# Patient Record
Sex: MALE | Race: OTHER | HISPANIC OR LATINO | URBAN - METROPOLITAN AREA
[De-identification: names, ages, dates, MRNs, and addresses within clinical notes are randomized per-mention and may not be internally consistent; named-entity substitution may affect disease eponyms.]

---

## 2023-09-13 ENCOUNTER — INPATIENT (INPATIENT)
Facility: HOSPITAL | Age: 63
LOS: 9 days | Discharge: HOME HEALTH | DRG: 180 | End: 2023-09-23
Attending: STUDENT IN AN ORGANIZED HEALTH CARE EDUCATION/TRAINING PROGRAM | Admitting: HOSPITALIST
Payer: MEDICAID

## 2023-09-13 VITALS
DIASTOLIC BLOOD PRESSURE: 90 MMHG | SYSTOLIC BLOOD PRESSURE: 140 MMHG | HEART RATE: 114 BPM | OXYGEN SATURATION: 99 % | TEMPERATURE: 98 F | RESPIRATION RATE: 18 BRPM

## 2023-09-13 LAB
ALBUMIN SERPL ELPH-MCNC: 3.5 G/DL — SIGNIFICANT CHANGE UP (ref 3.3–5)
ALP SERPL-CCNC: 160 U/L — HIGH (ref 40–120)
ALT FLD-CCNC: 48 U/L — SIGNIFICANT CHANGE UP (ref 12–78)
ANION GAP SERPL CALC-SCNC: 6 MMOL/L — SIGNIFICANT CHANGE UP (ref 5–17)
APTT BLD: 29.1 SEC — SIGNIFICANT CHANGE UP (ref 24.5–35.6)
AST SERPL-CCNC: 24 U/L — SIGNIFICANT CHANGE UP (ref 15–37)
BASOPHILS # BLD AUTO: 0 K/UL — SIGNIFICANT CHANGE UP (ref 0–0.2)
BASOPHILS NFR BLD AUTO: 0 % — SIGNIFICANT CHANGE UP (ref 0–2)
BILIRUB SERPL-MCNC: 0.3 MG/DL — SIGNIFICANT CHANGE UP (ref 0.2–1.2)
BUN SERPL-MCNC: 10 MG/DL — SIGNIFICANT CHANGE UP (ref 7–23)
CALCIUM SERPL-MCNC: 9.5 MG/DL — SIGNIFICANT CHANGE UP (ref 8.5–10.1)
CHLORIDE SERPL-SCNC: 100 MMOL/L — SIGNIFICANT CHANGE UP (ref 96–108)
CO2 SERPL-SCNC: 30 MMOL/L — SIGNIFICANT CHANGE UP (ref 22–31)
CREAT SERPL-MCNC: 0.87 MG/DL — SIGNIFICANT CHANGE UP (ref 0.5–1.3)
EGFR: 98 ML/MIN/1.73M2 — SIGNIFICANT CHANGE UP
EOSINOPHIL # BLD AUTO: 0 K/UL — SIGNIFICANT CHANGE UP (ref 0–0.5)
EOSINOPHIL NFR BLD AUTO: 0 % — SIGNIFICANT CHANGE UP (ref 0–6)
ERYTHROCYTE [SEDIMENTATION RATE] IN BLOOD: 86 MM/HR — HIGH (ref 0–20)
GLUCOSE BLDC GLUCOMTR-MCNC: 452 MG/DL — CRITICAL HIGH (ref 70–99)
GLUCOSE SERPL-MCNC: 592 MG/DL — CRITICAL HIGH (ref 70–99)
HCT VFR BLD CALC: 37.8 % — LOW (ref 39–50)
HGB BLD-MCNC: 13.5 G/DL — SIGNIFICANT CHANGE UP (ref 13–17)
INR BLD: 0.92 RATIO — SIGNIFICANT CHANGE UP (ref 0.85–1.18)
LACTATE SERPL-SCNC: 1.9 MMOL/L — SIGNIFICANT CHANGE UP (ref 0.7–2)
LYMPHOCYTES # BLD AUTO: 1.78 K/UL — SIGNIFICANT CHANGE UP (ref 1–3.3)
LYMPHOCYTES # BLD AUTO: 26 % — SIGNIFICANT CHANGE UP (ref 13–44)
MANUAL SMEAR VERIFICATION: SIGNIFICANT CHANGE UP
MCHC RBC-ENTMCNC: 31.9 PG — SIGNIFICANT CHANGE UP (ref 27–34)
MCHC RBC-ENTMCNC: 35.7 GM/DL — SIGNIFICANT CHANGE UP (ref 32–36)
MCV RBC AUTO: 89.4 FL — SIGNIFICANT CHANGE UP (ref 80–100)
MONOCYTES # BLD AUTO: 0.21 K/UL — SIGNIFICANT CHANGE UP (ref 0–0.9)
MONOCYTES NFR BLD AUTO: 3 % — SIGNIFICANT CHANGE UP (ref 2–14)
NEUTROPHILS # BLD AUTO: 4.8 K/UL — SIGNIFICANT CHANGE UP (ref 1.8–7.4)
NEUTROPHILS NFR BLD AUTO: 67 % — SIGNIFICANT CHANGE UP (ref 43–77)
NEUTS BAND # BLD: 3 % — SIGNIFICANT CHANGE UP (ref 0–8)
NRBC # BLD: 0 /100 — SIGNIFICANT CHANGE UP (ref 0–0)
NRBC # BLD: SIGNIFICANT CHANGE UP /100 WBCS (ref 0–0)
PLAT MORPH BLD: NORMAL — SIGNIFICANT CHANGE UP
PLATELET # BLD AUTO: 333 K/UL — SIGNIFICANT CHANGE UP (ref 150–400)
POTASSIUM SERPL-MCNC: 3.7 MMOL/L — SIGNIFICANT CHANGE UP (ref 3.5–5.3)
POTASSIUM SERPL-SCNC: 3.7 MMOL/L — SIGNIFICANT CHANGE UP (ref 3.5–5.3)
PROT SERPL-MCNC: 8.1 GM/DL — SIGNIFICANT CHANGE UP (ref 6–8.3)
PROTHROM AB SERPL-ACNC: 10.4 SEC — SIGNIFICANT CHANGE UP (ref 9.5–13)
RBC # BLD: 4.23 M/UL — SIGNIFICANT CHANGE UP (ref 4.2–5.8)
RBC # FLD: 11.8 % — SIGNIFICANT CHANGE UP (ref 10.3–14.5)
RBC BLD AUTO: NORMAL — SIGNIFICANT CHANGE UP
SODIUM SERPL-SCNC: 136 MMOL/L — SIGNIFICANT CHANGE UP (ref 135–145)
VARIANT LYMPHS # BLD: 1 % — SIGNIFICANT CHANGE UP (ref 0–6)
WBC # BLD: 6.85 K/UL — SIGNIFICANT CHANGE UP (ref 3.8–10.5)
WBC # FLD AUTO: 6.85 K/UL — SIGNIFICANT CHANGE UP (ref 3.8–10.5)

## 2023-09-13 PROCEDURE — 71046 X-RAY EXAM CHEST 2 VIEWS: CPT | Mod: 26

## 2023-09-13 PROCEDURE — 99285 EMERGENCY DEPT VISIT HI MDM: CPT

## 2023-09-13 PROCEDURE — 73130 X-RAY EXAM OF HAND: CPT | Mod: 26,LT

## 2023-09-13 RX ORDER — PIPERACILLIN AND TAZOBACTAM 4; .5 G/20ML; G/20ML
3.38 INJECTION, POWDER, LYOPHILIZED, FOR SOLUTION INTRAVENOUS ONCE
Refills: 0 | Status: COMPLETED | OUTPATIENT
Start: 2023-09-13 | End: 2023-09-13

## 2023-09-13 RX ORDER — VANCOMYCIN HCL 1 G
1000 VIAL (EA) INTRAVENOUS ONCE
Refills: 0 | Status: COMPLETED | OUTPATIENT
Start: 2023-09-13 | End: 2023-09-13

## 2023-09-13 RX ORDER — INSULIN HUMAN 100 [IU]/ML
12 INJECTION, SOLUTION SUBCUTANEOUS ONCE
Refills: 0 | Status: COMPLETED | OUTPATIENT
Start: 2023-09-13 | End: 2023-09-13

## 2023-09-13 RX ADMIN — INSULIN HUMAN 12 UNIT(S): 100 INJECTION, SOLUTION SUBCUTANEOUS at 23:52

## 2023-09-13 RX ADMIN — Medication 250 MILLIGRAM(S): at 22:55

## 2023-09-13 RX ADMIN — PIPERACILLIN AND TAZOBACTAM 200 GRAM(S): 4; .5 INJECTION, POWDER, LYOPHILIZED, FOR SOLUTION INTRAVENOUS at 21:59

## 2023-09-13 NOTE — ED ADULT NURSE NOTE - NSFALLUNIVINTERV_ED_ALL_ED
Bed/Stretcher in lowest position, wheels locked, appropriate side rails in place/Call bell, personal items and telephone in reach/Instruct patient to call for assistance before getting out of bed/chair/stretcher/Non-slip footwear applied when patient is off stretcher/[place] to call system/Physically safe environment - no spills, clutter or unnecessary equipment/Purposeful proactive rounding/Room/bathroom lighting operational, light cord in reach

## 2023-09-13 NOTE — ED STATDOCS - PROGRESS NOTE DETAILS
Yasir Valle for attending Dr. Camarillo: 64 y/o male presents to the ED c/o left 3rd finger necrosis and pain from injury 3 months ago. Will send to main for further evaluation.

## 2023-09-13 NOTE — PHARMACOTHERAPY INTERVENTION NOTE - COMMENTS
Medication reconciliation completed.  Pt does not take any medication at home, but states that he took an antibiotic for his finger.  Pt cannot confirm where he received the medication, nor if it was from a retail pharmacy or a facility (i.e. [facility], [facility]), nor the name of what he received.  Pt's son at bedside states "it's Amoxicillin, very low dose for him. It's ok."  No record of medication filled at any retail pharmacy per Dr. First Medrosi.

## 2023-09-13 NOTE — ED ADULT NURSE NOTE - OBJECTIVE STATEMENT
pt presents to ED c/o left third digit injury. Per pt, months ago he hurt his finger while laying in bed. Since then, injury has been getting progressively worse over the last 3 months. Pt w. necrotic finger. Denies pain.

## 2023-09-13 NOTE — CONSULT NOTE ADULT - SUBJECTIVE AND OBJECTIVE BOX
63y Male presents with necrotic 3rd distal phalanx after crush injury 3 mo prior. Patient report not seeing a physician after the injury. Denies numbness/tingling in the non necrotic aspect of the affected extremity. Denies head strike/LOC/other orthopedic injuries at this time. Patient is RIGHT hand dominant. Patient ambulates without assistance. Denies f/c.       PAST MEDICAL & SURGICAL HISTORY:    Home Medications:    Allergies    No Known Allergies    Intolerances                              13.5   6.85  )-----------( 333      ( 13 Sep 2023 21:13 )             37.8     09-13    136  |  100  |  10  ----------------------------<  592<HH>  3.7   |  30  |  0.87    Ca    9.5      13 Sep 2023 21:13    TPro  8.1  /  Alb  3.5  /  TBili  0.3  /  DBili  x   /  AST  24  /  ALT  48  /  AlkPhos  160<H>  09-13    PT/INR - ( 13 Sep 2023 21:13 )   PT: 10.4 sec;   INR: 0.92 ratio         PTT - ( 13 Sep 2023 21:13 )  PTT:29.1 sec  Urinalysis Basic - ( 13 Sep 2023 21:13 )    Color: x / Appearance: x / SG: x / pH: x  Gluc: 592 mg/dL / Ketone: x  / Bili: x / Urobili: x   Blood: x / Protein: x / Nitrite: x   Leuk Esterase: x / RBC: x / WBC x   Sq Epi: x / Non Sq Epi: x / Bacteria: x          Vital Signs Last 24 Hrs  T(C): 36.6 (13 Sep 2023 20:30), Max: 36.6 (13 Sep 2023 20:30)  T(F): 97.8 (13 Sep 2023 20:30), Max: 97.8 (13 Sep 2023 20:30)  HR: 114 (13 Sep 2023 20:30) (114 - 114)  BP: 140/90 (13 Sep 2023 20:30) (140/90 - 140/90)  BP(mean): 105 (13 Sep 2023 20:30) (105 - 105)  RR: 18 (13 Sep 2023 20:30) (18 - 18)  SpO2: 99% (13 Sep 2023 20:30) (99% - 99%)    Parameters below as of 13 Sep 2023 20:30  Patient On (Oxygen Delivery Method): room air        PHYSICAL EXAM  General: NAD, Awake and Alert, poor dentition       LUE:   Necrotic distal 3rd phalanx  +erythema & swelling  Mild tenderness to palpation over the 3rd digit  Minimal tenderness over the dorsum of the hand  NTTP over the bony prominences of the shoulder/elbow/wrist/hand  painless passive/active ROM of the shoulder/elbow/wrist/hand  C5-T1 SILT, motor grossly intact throughout axillary/musculocutaenous/radial/median/ulnar nerves  + radial pulse      SECONDARY EXAM: SILT throughout, motor grossly intact throughout, no other orthopedic injuries at this time, compartments soft and compressible          IMAGING:  XR : please refer to official report    Assessment/Plan:  63y Male with necrotic left third distal phalanx, cellulitis 3rd digit and dorsum of hand    -Digit will auto amputate, no urgent surgical intervention at present; can consider revision amputation on an outpatient basis  -Rec admission for IV abx for 3rd digit and hand cellulitis  -Pain control as needed  -DVT ppx per primary   -NWB L Hand  -Rec IV abx per primary vs ID  -Glycemic control   -FU A1c  -FU Labs  -ESR: 86  -Will monitor   -Will d/w Dr. Garcia  63y Male presents with necrotic 3rd distal phalanx after crush injury 3 mo prior. Patient report not seeing a physician after the injury. Denies numbness/tingling in the non necrotic aspect of the affected extremity. Denies head strike/LOC/other orthopedic injuries at this time. Patient is RIGHT hand dominant. Patient ambulates without assistance. Denies f/c.       PAST MEDICAL & SURGICAL HISTORY:    Home Medications:    Allergies    No Known Allergies    Intolerances                              13.5   6.85  )-----------( 333      ( 13 Sep 2023 21:13 )             37.8     09-13    136  |  100  |  10  ----------------------------<  592<HH>  3.7   |  30  |  0.87    Ca    9.5      13 Sep 2023 21:13    TPro  8.1  /  Alb  3.5  /  TBili  0.3  /  DBili  x   /  AST  24  /  ALT  48  /  AlkPhos  160<H>  09-13    PT/INR - ( 13 Sep 2023 21:13 )   PT: 10.4 sec;   INR: 0.92 ratio         PTT - ( 13 Sep 2023 21:13 )  PTT:29.1 sec  Urinalysis Basic - ( 13 Sep 2023 21:13 )    Color: x / Appearance: x / SG: x / pH: x  Gluc: 592 mg/dL / Ketone: x  / Bili: x / Urobili: x   Blood: x / Protein: x / Nitrite: x   Leuk Esterase: x / RBC: x / WBC x   Sq Epi: x / Non Sq Epi: x / Bacteria: x          Vital Signs Last 24 Hrs  T(C): 36.6 (13 Sep 2023 20:30), Max: 36.6 (13 Sep 2023 20:30)  T(F): 97.8 (13 Sep 2023 20:30), Max: 97.8 (13 Sep 2023 20:30)  HR: 114 (13 Sep 2023 20:30) (114 - 114)  BP: 140/90 (13 Sep 2023 20:30) (140/90 - 140/90)  BP(mean): 105 (13 Sep 2023 20:30) (105 - 105)  RR: 18 (13 Sep 2023 20:30) (18 - 18)  SpO2: 99% (13 Sep 2023 20:30) (99% - 99%)    Parameters below as of 13 Sep 2023 20:30  Patient On (Oxygen Delivery Method): room air        PHYSICAL EXAM  General: NAD, Awake and Alert, poor dentition       LUE:   Necrotic distal 3rd phalanx  +erythema & swelling  Mild tenderness to palpation over the 3rd digit  Minimal tenderness over the dorsum of the hand  NTTP over the bony prominences of the shoulder/elbow/wrist/hand  painless passive/active ROM of the shoulder/elbow/wrist/hand  C5-T1 SILT, motor grossly intact throughout axillary/musculocutaenous/radial/median/ulnar nerves  + radial pulse      SECONDARY EXAM: SILT throughout, motor grossly intact throughout, no other orthopedic injuries at this time, compartments soft and compressible          IMAGING:  XR : please refer to official report    Assessment/Plan:  63y Male with necrotic left third distal phalanx, cellulitis 3rd digit and dorsum of hand    -Digit will auto amputate, no urgent surgical intervention at present; can consider revision amputation on an outpatient basis; Patient at high risk for surgical complications given suspected uncontrolled DM  -Rec admission for IV abx for 3rd digit and hand cellulitis; suspected purulent drainage vs granulation tissue from 3rd finger  -Do not suspect flexor teno as digit is draining, no flexor posturing, no pain w/ extension; +swelling diffusely (not fusiform); +tenderness along tendon sheath, however diffusely mildly tender  -Pain control as needed  -DVT ppx per primary   -NWB L Hand  -Rec IV abx per primary vs ID  -Rec ID C/s for abx mgmt  -Glycemic control   -FU A1c  -FU Labs  -FU CRP  -FU BCx (Rec)  -ESR: 86  -Will monitor   -Will consider daily 50-50 soaks pending discussion w/ Dr. Garcia   -Will d/w Dr. Garcia

## 2023-09-13 NOTE — ED ADULT TRIAGE NOTE - CHIEF COMPLAINT QUOTE
- Start artificial tears BID-TID Pt ambulates to Er c/o left 3rd digit pain after injury 3 months ago. Swelling and necrosis noted.

## 2023-09-13 NOTE — ED STATDOCS - NS ED ATTENDING STATEMENT MOD
This was a shared visit with the [facility]. I reviewed and verified the documentation and independently performed the documented: Attending Only

## 2023-09-14 DIAGNOSIS — L03.90 CELLULITIS, UNSPECIFIED: ICD-10-CM

## 2023-09-14 LAB
A1C WITH ESTIMATED AVERAGE GLUCOSE RESULT: >15.5 % — HIGH (ref 4–5.6)
A1C WITH ESTIMATED AVERAGE GLUCOSE RESULT: >15.5 % — HIGH (ref 4–5.6)
ADD ON TEST-SPECIMEN IN LAB: SIGNIFICANT CHANGE UP
ALBUMIN SERPL ELPH-MCNC: 2.8 G/DL — LOW (ref 3.3–5)
ALP SERPL-CCNC: 94 U/L — SIGNIFICANT CHANGE UP (ref 40–120)
ALT FLD-CCNC: 43 U/L — SIGNIFICANT CHANGE UP (ref 12–78)
ANION GAP SERPL CALC-SCNC: 6 MMOL/L — SIGNIFICANT CHANGE UP (ref 5–17)
APTT BLD: 27.2 SEC — SIGNIFICANT CHANGE UP (ref 24.5–35.6)
AST SERPL-CCNC: 26 U/L — SIGNIFICANT CHANGE UP (ref 15–37)
BASOPHILS # BLD AUTO: 0.01 K/UL — SIGNIFICANT CHANGE UP (ref 0–0.2)
BASOPHILS NFR BLD AUTO: 0.1 % — SIGNIFICANT CHANGE UP (ref 0–2)
BILIRUB SERPL-MCNC: 0.4 MG/DL — SIGNIFICANT CHANGE UP (ref 0.2–1.2)
BUN SERPL-MCNC: 8 MG/DL — SIGNIFICANT CHANGE UP (ref 7–23)
CALCIUM SERPL-MCNC: 8.6 MG/DL — SIGNIFICANT CHANGE UP (ref 8.5–10.1)
CHLORIDE SERPL-SCNC: 108 MMOL/L — SIGNIFICANT CHANGE UP (ref 96–108)
CHOLEST SERPL-MCNC: 153 MG/DL — SIGNIFICANT CHANGE UP
CO2 SERPL-SCNC: 26 MMOL/L — SIGNIFICANT CHANGE UP (ref 22–31)
CREAT SERPL-MCNC: 0.46 MG/DL — LOW (ref 0.5–1.3)
CRP SERPL-MCNC: 96 MG/L — HIGH
EGFR: 118 ML/MIN/1.73M2 — SIGNIFICANT CHANGE UP
EOSINOPHIL # BLD AUTO: 0.02 K/UL — SIGNIFICANT CHANGE UP (ref 0–0.5)
EOSINOPHIL NFR BLD AUTO: 0.3 % — SIGNIFICANT CHANGE UP (ref 0–6)
ESTIMATED AVERAGE GLUCOSE: >398 MG/DL — HIGH (ref 68–114)
ESTIMATED AVERAGE GLUCOSE: >398 MG/DL — HIGH (ref 68–114)
GLUCOSE BLDC GLUCOMTR-MCNC: 207 MG/DL — HIGH (ref 70–99)
GLUCOSE BLDC GLUCOMTR-MCNC: 224 MG/DL — HIGH (ref 70–99)
GLUCOSE BLDC GLUCOMTR-MCNC: 252 MG/DL — HIGH (ref 70–99)
GLUCOSE BLDC GLUCOMTR-MCNC: 285 MG/DL — HIGH (ref 70–99)
GLUCOSE BLDC GLUCOMTR-MCNC: 305 MG/DL — HIGH (ref 70–99)
GLUCOSE SERPL-MCNC: 266 MG/DL — HIGH (ref 70–99)
HCT VFR BLD CALC: 31.7 % — LOW (ref 39–50)
HCV AB S/CO SERPL IA: 0.11 S/CO — SIGNIFICANT CHANGE UP (ref 0–0.99)
HCV AB SERPL-IMP: SIGNIFICANT CHANGE UP
HDLC SERPL-MCNC: 45 MG/DL — SIGNIFICANT CHANGE UP
HGB BLD-MCNC: 11.2 G/DL — LOW (ref 13–17)
IMM GRANULOCYTES NFR BLD AUTO: 0.6 % — SIGNIFICANT CHANGE UP (ref 0–0.9)
INR BLD: 1 RATIO — SIGNIFICANT CHANGE UP (ref 0.85–1.18)
LIPID PNL WITH DIRECT LDL SERPL: 82 MG/DL — SIGNIFICANT CHANGE UP
LYMPHOCYTES # BLD AUTO: 2.05 K/UL — SIGNIFICANT CHANGE UP (ref 1–3.3)
LYMPHOCYTES # BLD AUTO: 29.6 % — SIGNIFICANT CHANGE UP (ref 13–44)
MAGNESIUM SERPL-MCNC: 1.9 MG/DL — SIGNIFICANT CHANGE UP (ref 1.6–2.6)
MCHC RBC-ENTMCNC: 31.8 PG — SIGNIFICANT CHANGE UP (ref 27–34)
MCHC RBC-ENTMCNC: 35.3 GM/DL — SIGNIFICANT CHANGE UP (ref 32–36)
MCV RBC AUTO: 90.1 FL — SIGNIFICANT CHANGE UP (ref 80–100)
MONOCYTES # BLD AUTO: 0.51 K/UL — SIGNIFICANT CHANGE UP (ref 0–0.9)
MONOCYTES NFR BLD AUTO: 7.4 % — SIGNIFICANT CHANGE UP (ref 2–14)
NEUTROPHILS # BLD AUTO: 4.3 K/UL — SIGNIFICANT CHANGE UP (ref 1.8–7.4)
NEUTROPHILS NFR BLD AUTO: 62 % — SIGNIFICANT CHANGE UP (ref 43–77)
NON HDL CHOLESTEROL: 108 MG/DL — SIGNIFICANT CHANGE UP
PLATELET # BLD AUTO: 296 K/UL — SIGNIFICANT CHANGE UP (ref 150–400)
POTASSIUM SERPL-MCNC: 3 MMOL/L — LOW (ref 3.5–5.3)
POTASSIUM SERPL-SCNC: 3 MMOL/L — LOW (ref 3.5–5.3)
PROT SERPL-MCNC: 6.5 GM/DL — SIGNIFICANT CHANGE UP (ref 6–8.3)
PROTHROM AB SERPL-ACNC: 11.3 SEC — SIGNIFICANT CHANGE UP (ref 9.5–13)
RBC # BLD: 3.52 M/UL — LOW (ref 4.2–5.8)
RBC # FLD: 11.9 % — SIGNIFICANT CHANGE UP (ref 10.3–14.5)
SODIUM SERPL-SCNC: 140 MMOL/L — SIGNIFICANT CHANGE UP (ref 135–145)
TRIGL SERPL-MCNC: 152 MG/DL — HIGH
WBC # BLD: 6.93 K/UL — SIGNIFICANT CHANGE UP (ref 3.8–10.5)
WBC # FLD AUTO: 6.93 K/UL — SIGNIFICANT CHANGE UP (ref 3.8–10.5)

## 2023-09-14 PROCEDURE — 80048 BASIC METABOLIC PNL TOTAL CA: CPT

## 2023-09-14 PROCEDURE — 86901 BLOOD TYPING SEROLOGIC RH(D): CPT

## 2023-09-14 PROCEDURE — 87102 FUNGUS ISOLATION CULTURE: CPT

## 2023-09-14 PROCEDURE — A9579: CPT

## 2023-09-14 PROCEDURE — 86803 HEPATITIS C AB TEST: CPT

## 2023-09-14 PROCEDURE — 93306 TTE W/DOPPLER COMPLETE: CPT

## 2023-09-14 PROCEDURE — 93010 ELECTROCARDIOGRAM REPORT: CPT

## 2023-09-14 PROCEDURE — 85027 COMPLETE CBC AUTOMATED: CPT

## 2023-09-14 PROCEDURE — 83036 HEMOGLOBIN GLYCOSYLATED A1C: CPT

## 2023-09-14 PROCEDURE — 80061 LIPID PANEL: CPT

## 2023-09-14 PROCEDURE — 80053 COMPREHEN METABOLIC PANEL: CPT

## 2023-09-14 PROCEDURE — 85025 COMPLETE CBC W/AUTO DIFF WBC: CPT

## 2023-09-14 PROCEDURE — 80202 ASSAY OF VANCOMYCIN: CPT

## 2023-09-14 PROCEDURE — 93005 ELECTROCARDIOGRAM TRACING: CPT

## 2023-09-14 PROCEDURE — 86850 RBC ANTIBODY SCREEN: CPT

## 2023-09-14 PROCEDURE — 87116 MYCOBACTERIA CULTURE: CPT

## 2023-09-14 PROCEDURE — 85610 PROTHROMBIN TIME: CPT

## 2023-09-14 PROCEDURE — 73220 MRI UPPR EXTREMITY W/O&W/DYE: CPT | Mod: LT

## 2023-09-14 PROCEDURE — 87186 SC STD MICRODIL/AGAR DIL: CPT

## 2023-09-14 PROCEDURE — 36415 COLL VENOUS BLD VENIPUNCTURE: CPT

## 2023-09-14 PROCEDURE — 86900 BLOOD TYPING SEROLOGIC ABO: CPT

## 2023-09-14 PROCEDURE — 99497 ADVNCD CARE PLAN 30 MIN: CPT | Mod: 25

## 2023-09-14 PROCEDURE — 99223 1ST HOSP IP/OBS HIGH 75: CPT

## 2023-09-14 PROCEDURE — 85730 THROMBOPLASTIN TIME PARTIAL: CPT

## 2023-09-14 PROCEDURE — 87070 CULTURE OTHR SPECIMN AEROBIC: CPT

## 2023-09-14 PROCEDURE — 83735 ASSAY OF MAGNESIUM: CPT

## 2023-09-14 PROCEDURE — 88305 TISSUE EXAM BY PATHOLOGIST: CPT

## 2023-09-14 PROCEDURE — 87075 CULTR BACTERIA EXCEPT BLOOD: CPT

## 2023-09-14 PROCEDURE — 82962 GLUCOSE BLOOD TEST: CPT

## 2023-09-14 PROCEDURE — 87077 CULTURE AEROBIC IDENTIFY: CPT

## 2023-09-14 RX ORDER — SODIUM CHLORIDE 9 MG/ML
1000 INJECTION INTRAMUSCULAR; INTRAVENOUS; SUBCUTANEOUS ONCE
Refills: 0 | Status: COMPLETED | OUTPATIENT
Start: 2023-09-14 | End: 2023-09-14

## 2023-09-14 RX ORDER — SODIUM CHLORIDE 9 MG/ML
1000 INJECTION, SOLUTION INTRAVENOUS
Refills: 0 | Status: DISCONTINUED | OUTPATIENT
Start: 2023-09-14 | End: 2023-09-23

## 2023-09-14 RX ORDER — PIPERACILLIN AND TAZOBACTAM 4; .5 G/20ML; G/20ML
3.38 INJECTION, POWDER, LYOPHILIZED, FOR SOLUTION INTRAVENOUS EVERY 8 HOURS
Refills: 0 | Status: DISCONTINUED | OUTPATIENT
Start: 2023-09-14 | End: 2023-09-14

## 2023-09-14 RX ORDER — INSULIN GLARGINE 100 [IU]/ML
20 INJECTION, SOLUTION SUBCUTANEOUS AT BEDTIME
Refills: 0 | Status: DISCONTINUED | OUTPATIENT
Start: 2023-09-14 | End: 2023-09-23

## 2023-09-14 RX ORDER — POTASSIUM CHLORIDE 20 MEQ
40 PACKET (EA) ORAL EVERY 4 HOURS
Refills: 0 | Status: COMPLETED | OUTPATIENT
Start: 2023-09-14 | End: 2023-09-14

## 2023-09-14 RX ORDER — CEFTRIAXONE 500 MG/1
2000 INJECTION, POWDER, FOR SOLUTION INTRAMUSCULAR; INTRAVENOUS EVERY 24 HOURS
Refills: 0 | Status: DISCONTINUED | OUTPATIENT
Start: 2023-09-14 | End: 2023-09-23

## 2023-09-14 RX ORDER — VANCOMYCIN HCL 1 G
1000 VIAL (EA) INTRAVENOUS EVERY 12 HOURS
Refills: 0 | Status: DISCONTINUED | OUTPATIENT
Start: 2023-09-14 | End: 2023-09-15

## 2023-09-14 RX ORDER — GLUCAGON INJECTION, SOLUTION 0.5 MG/.1ML
1 INJECTION, SOLUTION SUBCUTANEOUS ONCE
Refills: 0 | Status: DISCONTINUED | OUTPATIENT
Start: 2023-09-14 | End: 2023-09-23

## 2023-09-14 RX ORDER — ACETAMINOPHEN 500 MG
650 TABLET ORAL EVERY 6 HOURS
Refills: 0 | Status: DISCONTINUED | OUTPATIENT
Start: 2023-09-14 | End: 2023-09-23

## 2023-09-14 RX ORDER — INSULIN LISPRO 100/ML
VIAL (ML) SUBCUTANEOUS AT BEDTIME
Refills: 0 | Status: DISCONTINUED | OUTPATIENT
Start: 2023-09-14 | End: 2023-09-23

## 2023-09-14 RX ORDER — LANOLIN ALCOHOL/MO/W.PET/CERES
3 CREAM (GRAM) TOPICAL AT BEDTIME
Refills: 0 | Status: DISCONTINUED | OUTPATIENT
Start: 2023-09-14 | End: 2023-09-23

## 2023-09-14 RX ORDER — ONDANSETRON 8 MG/1
4 TABLET, FILM COATED ORAL EVERY 8 HOURS
Refills: 0 | Status: DISCONTINUED | OUTPATIENT
Start: 2023-09-14 | End: 2023-09-23

## 2023-09-14 RX ORDER — INSULIN HUMAN 100 [IU]/ML
10 INJECTION, SOLUTION SUBCUTANEOUS ONCE
Refills: 0 | Status: COMPLETED | OUTPATIENT
Start: 2023-09-14 | End: 2023-09-14

## 2023-09-14 RX ORDER — INFLUENZA VIRUS VACCINE 15; 15; 15; 15 UG/.5ML; UG/.5ML; UG/.5ML; UG/.5ML
0.5 SUSPENSION INTRAMUSCULAR ONCE
Refills: 0 | Status: DISCONTINUED | OUTPATIENT
Start: 2023-09-14 | End: 2023-09-23

## 2023-09-14 RX ORDER — INSULIN LISPRO 100/ML
VIAL (ML) SUBCUTANEOUS
Refills: 0 | Status: DISCONTINUED | OUTPATIENT
Start: 2023-09-14 | End: 2023-09-23

## 2023-09-14 RX ORDER — LISINOPRIL 2.5 MG/1
5 TABLET ORAL DAILY
Refills: 0 | Status: DISCONTINUED | OUTPATIENT
Start: 2023-09-14 | End: 2023-09-23

## 2023-09-14 RX ORDER — DEXTROSE 50 % IN WATER 50 %
25 SYRINGE (ML) INTRAVENOUS ONCE
Refills: 0 | Status: DISCONTINUED | OUTPATIENT
Start: 2023-09-14 | End: 2023-09-23

## 2023-09-14 RX ORDER — ENOXAPARIN SODIUM 100 MG/ML
40 INJECTION SUBCUTANEOUS EVERY 24 HOURS
Refills: 0 | Status: COMPLETED | OUTPATIENT
Start: 2023-09-14 | End: 2023-09-16

## 2023-09-14 RX ORDER — BACITRACIN ZINC 500 UNIT/G
1 OINTMENT IN PACKET (EA) TOPICAL
Refills: 0 | Status: DISCONTINUED | OUTPATIENT
Start: 2023-09-14 | End: 2023-09-23

## 2023-09-14 RX ORDER — DEXTROSE 50 % IN WATER 50 %
15 SYRINGE (ML) INTRAVENOUS ONCE
Refills: 0 | Status: DISCONTINUED | OUTPATIENT
Start: 2023-09-14 | End: 2023-09-23

## 2023-09-14 RX ORDER — DEXTROSE 50 % IN WATER 50 %
12.5 SYRINGE (ML) INTRAVENOUS ONCE
Refills: 0 | Status: DISCONTINUED | OUTPATIENT
Start: 2023-09-14 | End: 2023-09-23

## 2023-09-14 RX ADMIN — Medication 4: at 12:00

## 2023-09-14 RX ADMIN — SODIUM CHLORIDE 1000 MILLILITER(S): 9 INJECTION INTRAMUSCULAR; INTRAVENOUS; SUBCUTANEOUS at 04:58

## 2023-09-14 RX ADMIN — Medication 40 MILLIEQUIVALENT(S): at 12:00

## 2023-09-14 RX ADMIN — Medication 40 MILLIEQUIVALENT(S): at 17:06

## 2023-09-14 RX ADMIN — Medication 3: at 21:48

## 2023-09-14 RX ADMIN — LISINOPRIL 5 MILLIGRAM(S): 2.5 TABLET ORAL at 21:49

## 2023-09-14 RX ADMIN — Medication 6: at 08:22

## 2023-09-14 RX ADMIN — Medication 250 MILLIGRAM(S): at 21:47

## 2023-09-14 RX ADMIN — Medication 1 APPLICATION(S): at 21:49

## 2023-09-14 RX ADMIN — Medication 1 APPLICATION(S): at 06:25

## 2023-09-14 RX ADMIN — Medication 650 MILLIGRAM(S): at 06:43

## 2023-09-14 RX ADMIN — Medication 8: at 17:06

## 2023-09-14 RX ADMIN — Medication 1 APPLICATION(S): at 10:50

## 2023-09-14 RX ADMIN — INSULIN GLARGINE 20 UNIT(S): 100 INJECTION, SOLUTION SUBCUTANEOUS at 22:20

## 2023-09-14 RX ADMIN — CEFTRIAXONE 2000 MILLIGRAM(S): 500 INJECTION, POWDER, FOR SOLUTION INTRAMUSCULAR; INTRAVENOUS at 11:00

## 2023-09-14 RX ADMIN — ENOXAPARIN SODIUM 40 MILLIGRAM(S): 100 INJECTION SUBCUTANEOUS at 21:48

## 2023-09-14 RX ADMIN — Medication 250 MILLIGRAM(S): at 10:49

## 2023-09-14 RX ADMIN — PIPERACILLIN AND TAZOBACTAM 25 GRAM(S): 4; .5 INJECTION, POWDER, LYOPHILIZED, FOR SOLUTION INTRAVENOUS at 06:57

## 2023-09-14 NOTE — H&P ADULT - SKIN COMMENTS
left hand dressed with gauze, unable to appreciate wound however pt described underlying skin to be red with areas of dark/dusk and also tan tissue

## 2023-09-14 NOTE — H&P ADULT - HISTORY OF PRESENT ILLNESS
Pt is a 62 yo male who denies any medical problems, who presents to ED due to worsening infection of left hand. Pt states he injured his hand about three months ago and did not see a doctor or go to hospital due to lack of insurance/funds. Pt states he was hurt and since has been treating at home with salves, home remedies but nothing helps. Pt endorses having chills and feeling ill but not sure if febrile. States there is a discharge from the hand that is at times malodorous. States he is also having pain but not since his hand being wrapped in ED.

## 2023-09-14 NOTE — CONSULT NOTE ADULT - ASSESSMENT
62 y/o male who denies any medical problems ws admitted on 9/13 for worsening infection of left hand. Pt states he injured his hand about three months ago and did not see a doctor or go to hospital due to lack of insurance/funds. Pt states he was hurt and since has been treating at home with salves, home remedies but nothing helps. Pt endorses having chills and feeling ill but not sure if febrile. States there is a discharge from the hand that is at times malodorous. States he is also having pain but not since his hand being wrapped in ED. In ER he received vancomycin IV and zosyn.    1. Left hand cellulitis.  62 y/o male who denies any medical problems ws admitted on 9/13 for worsening infection of left hand. Pt states he injured his hand about three months ago and did not see a doctor or go to hospital due to lack of insurance/funds. Pt states he was hurt and since has been treating at home with salves, home remedies but nothing helps. Pt endorses having chills and feeling ill but not sure if febrile. States there is a discharge from the hand that is at times malodorous. States he is also having pain but not since his hand being wrapped in ED. In ER he received vancomycin IV and zosyn.    1. Left 3rd finger tip necrosis with finger and hand cellulitis.   -finger is dry - no culture can be obtained  -no clinical signs of sepsis  -start vancomycin 1000 mg IV q12h and ceftriaxone 2 gm IV qd  -reason for abx use and side effects reviewed with patient; monitor BMP and vancomycin trough levels   -local wound care per ortho  -old chart reviewed to assess prior cultures  -monitor temps  -f/u CBC  -supportive care  2. Other issues:   -care per medicine

## 2023-09-14 NOTE — CONSULT NOTE ADULT - SUBJECTIVE AND OBJECTIVE BOX
Patient is a 63y old  Male who presents with a chief complaint of Osteomyelitis of left third distal phalanx with cellulitis of third digit and dorsum of hand     HPI:  62 y/o male who denies any medical problems ws admitted on  for worsening infection of left hand. Pt states he injured his hand about three months ago and did not see a doctor or go to hospital due to lack of insurance/funds. Pt states he was hurt and since has been treating at home with salves, home remedies but nothing helps. Pt endorses having chills and feeling ill but not sure if febrile. States there is a discharge from the hand that is at times malodorous. States he is also having pain but not since his hand being wrapped in ED. In ER he received vancomycin IV and zosyn.      PMH: as above  PSH: as above  Meds: per reconciliation sheet, noted below  MEDICATIONS  (STANDING):  bacitracin   Ointment 1 Application(s) Topical two times a day  dextrose 5%. 1000 milliLiter(s) (100 mL/Hr) IV Continuous <Continuous>  dextrose 5%. 1000 milliLiter(s) (50 mL/Hr) IV Continuous <Continuous>  dextrose 50% Injectable 12.5 Gram(s) IV Push once  dextrose 50% Injectable 25 Gram(s) IV Push once  dextrose 50% Injectable 25 Gram(s) IV Push once  glucagon  Injectable 1 milliGRAM(s) IntraMuscular once  influenza   Vaccine 0.5 milliLiter(s) IntraMuscular once  insulin lispro (ADMELOG) corrective regimen sliding scale   SubCutaneous at bedtime  insulin lispro (ADMELOG) corrective regimen sliding scale   SubCutaneous three times a day before meals  piperacillin/tazobactam IVPB.. 3.375 Gram(s) IV Intermittent every 8 hours  vancomycin  IVPB 1000 milliGRAM(s) IV Intermittent every 12 hours    MEDICATIONS  (PRN):  acetaminophen     Tablet .. 650 milliGRAM(s) Oral every 6 hours PRN Temp greater or equal to 38C (100.4F), Mild Pain (1 - 3)  dextrose Oral Gel 15 Gram(s) Oral once PRN Blood Glucose LESS THAN 70 milliGRAM(s)/deciliter  melatonin 3 milliGRAM(s) Oral at bedtime PRN Insomnia  ondansetron Injectable 4 milliGRAM(s) IV Push every 8 hours PRN Nausea and/or Vomiting  oxycodone    5 mG/acetaminophen 325 mG 1 Tablet(s) Oral every 4 hours PRN Severe Pain (7 - 10)    Allergies    No Known Allergies    Intolerances      Social: no smoking, no alcohol, no illegal drugs; no recent travel, no exposure to TB  FAMILY HISTORY:  No pertinent family history in first degree relatives      no history of premature cardiovascular disease in first degree relatives    ROS: the patient denies fever, no chills, no HA, no seizures, no dizziness, no sore throat, no nasal congestion, no blurry vision, no CP, no palpitations, no SOB, no cough, no abdominal pain, no diarrhea, no N/V, no dysuria, no leg pain, no claudication, left hand pain and redness, no joint aches, no rectal pain or bleeding, no night sweats  All other systems reviewed and are negative    Vital Signs Last 24 Hrs  T(C): 36.4 (14 Sep 2023 08:34), Max: 37.2 (14 Sep 2023 03:47)  T(F): 97.6 (14 Sep 2023 08:34), Max: 99 (14 Sep 2023 03:47)  HR: 84 (14 Sep 2023 08:34) (84 - 114)  BP: 121/74 (14 Sep 2023 08:34) (121/74 - 140/90)  BP(mean): 105 (13 Sep 2023 20:30) (105 - 105)  RR: 18 (14 Sep 2023 08:34) (18 - 18)  SpO2: 98% (14 Sep 2023 08:34) (98% - 100%)    Parameters below as of 14 Sep 2023 08:34  Patient On (Oxygen Delivery Method): room air      Daily     Daily Weight in k.6 (14 Sep 2023 05:13)    PE:    Constitutional:  No acute distress  HEENT: NC/AT, EOMI, PERRLA, conjunctivae clear; ears and nose atraumatic; pharynx benign  Neck: supple; thyroid not palpable  Back: no tenderness  Respiratory: respiratory effort normal; clear to auscultation  Cardiovascular: S1S2 regular, no murmurs  Abdomen: soft, not tender, not distended, positive BS; no liver or spleen organomegaly  Genitourinary: no suprapubic tenderness  Lymphatic: no LN palpable  Musculoskeletal: no muscle tenderness, no joint swelling or tenderness  Extremities: no pedal edema  Left third digit necrosis with erythema  Neurological/ Psychiatric: AxOx3, judgement and insight normal; moving all extremities  Skin: no rashes; no palpable lesions    Labs: all available labs reviewed                        11.2   6.93  )-----------( 296      ( 14 Sep 2023 07:31 )             31.7         140  |  108  |  8   ----------------------------<  266<H>  3.0<L>   |  26  |  0.46<L>    Ca    8.6      14 Sep 2023 07:31    TPro  6.5  /  Alb  2.8<L>  /  TBili  0.4  /  DBili  x   /  AST  26  /  ALT  43  /  AlkPhos  94       LIVER FUNCTIONS - ( 14 Sep 2023 07:31 )  Alb: 2.8 g/dL / Pro: 6.5 gm/dL / ALK PHOS: 94 U/L / ALT: 43 U/L / AST: 26 U/L / GGT: x           Radiology: all available radiological tests reviewed    Advanced directives addressed: full resuscitation Patient is a 63y old  Male who presents with a chief complaint of Osteomyelitis of left third distal phalanx with cellulitis of third digit and dorsum of hand     HPI:  64 y/o male who denies any medical problems ws admitted on  for worsening infection of left hand. Pt states he injured his hand about three months ago and did not see a doctor or go to hospital due to lack of insurance/funds. Pt states he was hurt and since has been treating at home with salves, home remedies but nothing helps. Pt endorses having chills and feeling ill but not sure if febrile. States there is a discharge from the hand that is at times malodorous. States he is also having pain but not since his hand being wrapped in ED. In ER he received vancomycin IV and zosyn.      PMH: as above  PSH: as above  Meds: per reconciliation sheet, noted below  MEDICATIONS  (STANDING):  bacitracin   Ointment 1 Application(s) Topical two times a day  dextrose 5%. 1000 milliLiter(s) (100 mL/Hr) IV Continuous <Continuous>  dextrose 5%. 1000 milliLiter(s) (50 mL/Hr) IV Continuous <Continuous>  dextrose 50% Injectable 12.5 Gram(s) IV Push once  dextrose 50% Injectable 25 Gram(s) IV Push once  dextrose 50% Injectable 25 Gram(s) IV Push once  glucagon  Injectable 1 milliGRAM(s) IntraMuscular once  influenza   Vaccine 0.5 milliLiter(s) IntraMuscular once  insulin lispro (ADMELOG) corrective regimen sliding scale   SubCutaneous at bedtime  insulin lispro (ADMELOG) corrective regimen sliding scale   SubCutaneous three times a day before meals  piperacillin/tazobactam IVPB.. 3.375 Gram(s) IV Intermittent every 8 hours  vancomycin  IVPB 1000 milliGRAM(s) IV Intermittent every 12 hours    MEDICATIONS  (PRN):  acetaminophen     Tablet .. 650 milliGRAM(s) Oral every 6 hours PRN Temp greater or equal to 38C (100.4F), Mild Pain (1 - 3)  dextrose Oral Gel 15 Gram(s) Oral once PRN Blood Glucose LESS THAN 70 milliGRAM(s)/deciliter  melatonin 3 milliGRAM(s) Oral at bedtime PRN Insomnia  ondansetron Injectable 4 milliGRAM(s) IV Push every 8 hours PRN Nausea and/or Vomiting  oxycodone    5 mG/acetaminophen 325 mG 1 Tablet(s) Oral every 4 hours PRN Severe Pain (7 - 10)    Allergies    No Known Allergies    Intolerances      Social: no smoking, no alcohol, no illegal drugs; no recent travel, no exposure to TB  FAMILY HISTORY:  No pertinent family history in first degree relatives      no history of premature cardiovascular disease in first degree relatives    ROS: the patient denies fever, no chills, no HA, no seizures, no dizziness, no sore throat, no nasal congestion, no blurry vision, no CP, no palpitations, no SOB, no cough, no abdominal pain, no diarrhea, no N/V, no dysuria, no leg pain, no claudication, left hand pain and redness, no joint aches, no rectal pain or bleeding, no night sweats  All other systems reviewed and are negative    Vital Signs Last 24 Hrs  T(C): 36.4 (14 Sep 2023 08:34), Max: 37.2 (14 Sep 2023 03:47)  T(F): 97.6 (14 Sep 2023 08:34), Max: 99 (14 Sep 2023 03:47)  HR: 84 (14 Sep 2023 08:34) (84 - 114)  BP: 121/74 (14 Sep 2023 08:34) (121/74 - 140/90)  BP(mean): 105 (13 Sep 2023 20:30) (105 - 105)  RR: 18 (14 Sep 2023 08:34) (18 - 18)  SpO2: 98% (14 Sep 2023 08:34) (98% - 100%)    Parameters below as of 14 Sep 2023 08:34  Patient On (Oxygen Delivery Method): room air      Daily     Daily Weight in k.6 (14 Sep 2023 05:13)    PE:    Constitutional:  No acute distress  HEENT: NC/AT, EOMI, PERRLA, conjunctivae clear; ears and nose atraumatic; pharynx benign  Neck: supple; thyroid not palpable  Back: no tenderness  Respiratory: respiratory effort normal; clear to auscultation  Cardiovascular: S1S2 regular, no murmurs  Abdomen: soft, not tender, not distended, positive BS; no liver or spleen organomegaly  Genitourinary: no suprapubic tenderness  Lymphatic: no LN palpable  Musculoskeletal: no muscle tenderness, no joint swelling or tenderness  Extremities: no pedal edema  Left third digit necrosis with erythema extending toward hand; finger is dry, no discharge  Neurological/ Psychiatric: AxOx3, judgement and insight normal; moving all extremities  Skin: no rashes; no palpable lesions    Labs: all available labs reviewed                        11.2   693  )-----------( 296      ( 14 Sep 2023 07:31 )             31.7     09-    140  |  108  |  8   ----------------------------<  266<H>  3.0<L>   |  26  |  0.46<L>    Ca    8.6      14 Sep 2023 07:31    TPro  6.5  /  Alb  2.8<L>  /  TBili  0.4  /  DBili  x   /  AST  26  /  ALT  43  /  AlkPhos  94       LIVER FUNCTIONS - ( 14 Sep 2023 07:31 )  Alb: 2.8 g/dL / Pro: 6.5 gm/dL / ALK PHOS: 94 U/L / ALT: 43 U/L / AST: 26 U/L / GGT: x           Radiology: all available radiological tests reviewed    Advanced directives addressed: full resuscitation

## 2023-09-14 NOTE — ED PROVIDER NOTE - CLINICAL SUMMARY MEDICAL DECISION MAKING FREE TEXT BOX
High risk of wound infection necrosis of left middle finger distal phalange E with associated proximal cellulitis seen by Ortho hand recommend IV antibiotics admission the medicine service patient pain currently controlled patient agrees to plan of care

## 2023-09-14 NOTE — PROGRESS NOTE ADULT - SUBJECTIVE AND OBJECTIVE BOX
Patient seen and examined at bedside. Patient states that his pain is better controlled. Denies numbness/tingling in the non necrotic aspect of the affected extremity.    LABS:                        11.2   6.93  )-----------( 296      ( 14 Sep 2023 07:31 )             31.7     09-14    140  |  108  |  8   ----------------------------<  266<H>  3.0<L>   |  26  |  0.46<L>    Ca    8.6      14 Sep 2023 07:31    TPro  6.5  /  Alb  2.8<L>  /  TBili  0.4  /  DBili  x   /  AST  26  /  ALT  43  /  AlkPhos  94  09-14    PT/INR - ( 14 Sep 2023 07:31 )   PT: 11.3 sec;   INR: 1.00 ratio         PTT - ( 14 Sep 2023 07:31 )  PTT:27.2 sec  Urinalysis Basic - ( 14 Sep 2023 07:31 )    Color: x / Appearance: x / SG: x / pH: x  Gluc: 266 mg/dL / Ketone: x  / Bili: x / Urobili: x   Blood: x / Protein: x / Nitrite: x   Leuk Esterase: x / RBC: x / WBC x   Sq Epi: x / Non Sq Epi: x / Bacteria: x        VITAL SIGNS:  T(C): 36.4 (09-14-23 @ 08:34), Max: 37.2 (09-14-23 @ 03:47)  HR: 84 (09-14-23 @ 08:34) (84 - 114)  BP: 121/74 (09-14-23 @ 08:34) (121/74 - 140/90)  RR: 18 (09-14-23 @ 08:34) (18 - 18)  SpO2: 98% (09-14-23 @ 08:34) (98% - 100%)    PHYSICAL EXAM  General: NAD, Awake and Alert, poor dentition       LUE:   Dressings c/d/i  Mild tenderness to palpation over the 3rd digit  Minimal tenderness over the dorsum of the hand  painless passive/active ROM of the shoulder/elbow/wrist/hand  SILT  motor grossly intact throughout axillary/musculocutaenous/radial/median/ulnar nerves  + radial pulse      IMAGING:  XR : please refer to official report    Assessment/Plan:  63y Male with necrotic left third distal phalanx, cellulitis 3rd digit and dorsum of hand    -Digit will auto amputate, no urgent surgical intervention at present; can consider revision amputation on an outpatient basis; Patient at high risk for surgical complications given suspected uncontrolled DM  -Rec admission for IV abx for 3rd digit and hand cellulitis; suspected purulent drainage vs granulation tissue from 3rd finger  -Do not suspect flexor teno as digit is draining, no flexor posturing, no pain w/ extension; +swelling diffusely (not fusiform); +tenderness along tendon sheath, however diffusely mildly tender  -Pain control as needed  -DVT ppx per primary   -NWB L Hand  -Rec IV abx per primary vs ID  -Rec ID C/s for abx mgmt  -Glycemic control   -FU A1c  -FU Labs  -FU CRP  -FU BCx (Rec)  -ESR: 86  -Will monitor   -Will consider daily 50-50 soaks pending discussion w/ Dr. Garcia     Discussed with Dr. Garcia who is aware and agrees with above plan,.

## 2023-09-14 NOTE — CHART NOTE - NSCHARTNOTEFT_GEN_A_CORE
Pt seen and evaluated. Admitted this AM   VSS     Plan   -IV abx: Vanc + Rocephin   -Add Lantus 20u QHS   -Add low dose ACEi   -Lovenox

## 2023-09-14 NOTE — H&P ADULT - ENDOCRINE
Cross Cover Note    Notified by RN that Ms. James was demonstrating intrusive behavior (e.g. going up to other patients' doors in the late evening). Per discussion with RN, order for SIO due to severe intrusiveness placed. See order for additional details.     Hilda Goldstein MD  Psychiatry PGY-2     negative

## 2023-09-14 NOTE — H&P ADULT - TIME BILLING
A minimum of 76 min was spent completing this admission not including time spent discussing advanced care planning or discussing goals of care with a minimum of 36 min spent face to face with patient while in ED unit on admission, counseling patient on current acute on chronic conditions and discussing plan and coordination of care including diagnostic laboratory tests which were ordered and reasoning behind each test, discussion of consultants ordered to evaluate patient in am and reasoning behind each specific need such as ortho hand and ID and also social work to discuss outpatient establishment of care and available resources.

## 2023-09-14 NOTE — ED PROVIDER NOTE - PHYSICAL EXAMINATION
MSK LUE:   Necrotic distal 3rd phalanx  +erythema & swelling  Mild tenderness to palpation over the 3rd digit  Minimal tenderness over the dorsum of the hand  NTTP over the bony prominences of the shoulder/elbow/wrist/hand  painless passive/active ROM of the shoulder/elbow/wrist/hand  C5-T1 SILT, motor grossly intact throughout axillary/musculocutaenous/radial/median/ulnar nerves  + radial pulse

## 2023-09-14 NOTE — PATIENT PROFILE ADULT - FALL HARM RISK - UNIVERSAL INTERVENTIONS
Bed in lowest position, wheels locked, appropriate side rails in place/Call bell, personal items and telephone in reach/Instruct patient to call for assistance before getting out of bed or chair/Non-slip footwear when patient is out of bed/[place] to call system/Physically safe environment - no spills, clutter or unnecessary equipment/Purposeful Proactive Rounding/Room/bathroom lighting operational, light cord in reach

## 2023-09-14 NOTE — ED PROVIDER NOTE - OBJECTIVE STATEMENT
63y Male presents with necrotic 3rd distal phalanx after crush injury 3 mo prior. Patient report not seeing a physician after the injury. Denies numbness/tingling in the non necrotic aspect of the affected extremity. Denies head strike/LOC/other orthopedic injuries at this time. Patient is RIGHT hand dominant. Patient ambulates without assistance.  No headache.  No chest pain or shortness of breath.  No abdominal pain.  No nausea vomiting diarrhea.

## 2023-09-14 NOTE — H&P ADULT - NSICDXFAMILYHX_GEN_ALL_CORE_FT
February 8, 2021        Shanel George  [place] [address]        Welcome to [facility]’s Care Management Program.  There is no extra cost to you.     I would like to partner with you to help lower your health risks and reach your goals for healthy living.  I can help you to follow your doctor’s plan of care.  I can offer support and arrange services to help you do the things you want and help you stay as healthy as possible.      [facility]’s Care Management Program Benefits:     · NO COST to you   · Convenient    · Specially trained RN   · Make your own health care plan   · Meds review with a Pharmacist as needed   · Link to health care, social and community resources     I will call to tell you more about the program, answer any questions you have and talk with you about your needs.  After the first phone call, we will talk about your health and wellness goals.       If you have any questions or if I can help you in any way, please call me at     [phone]  8:00 AM to 4:00 PM CST (Monday-Friday)    If I am busy, you can leave a message on my private voicemail and I’ll return your call.     I want to help you stay healthy and hope to work with you soon!     Sincerely,    Adelia Rodriguez, RN  [facility]      
FAMILY HISTORY:  No pertinent family history in first degree relatives

## 2023-09-14 NOTE — H&P ADULT - ASSESSMENT
Pt is a 64 yo male with no known pmhx, admitted due to:    #Osteomyelitis and cellulitis of left hand  admit to medicine  ID consult  Ortho hand consult appreciated, recs implemented  ESR elevated  c/w Vanco/Zosyn  f/u blood cultures  Tylenol prn pain, fever  Percocet prn severe pain  Ambulate as tolerated  SCD for DVT ppx  f/u AM labs  Social work consult- pt with poor health due to lack of funds/housing/employment    #New onset Diabetes mellitus  AISS  accuchecks qAC/HS  carb restriction  diet modification discussed  will need nutritional counseling  f/u A1c  Titrate insulin accordingly    #Chin laceration   Bacitracin bid

## 2023-09-14 NOTE — H&P ADULT - MUSCULOSKELETAL
decreased ROM in LUE due to pain in hand/ROM intact/decreased ROM due to pain/normal gait/strength 5/5 bilateral lower extremities negative

## 2023-09-14 NOTE — ED PROVIDER NOTE - CROS ED ENMT ALL NEG
Patient is requesting intermittent FMLA to attend her OV for DM. She did schedule an appointment for 9/11 to discuss this. Are you agreeable to FMLA? How much time off? Does she need this appointment?  Patient had one diabetic appt with you this year and no negative...

## 2023-09-14 NOTE — H&P ADULT - REASON FOR ADMISSION
Specific interventions were implemented:
Osteomyelitis of left third distal phalanx with cellulitis of third digit and dorsum of hand

## 2023-09-15 LAB
ANION GAP SERPL CALC-SCNC: 6 MMOL/L — SIGNIFICANT CHANGE UP (ref 5–17)
BUN SERPL-MCNC: 11 MG/DL — SIGNIFICANT CHANGE UP (ref 7–23)
CALCIUM SERPL-MCNC: 9.3 MG/DL — SIGNIFICANT CHANGE UP (ref 8.5–10.1)
CHLORIDE SERPL-SCNC: 104 MMOL/L — SIGNIFICANT CHANGE UP (ref 96–108)
CO2 SERPL-SCNC: 26 MMOL/L — SIGNIFICANT CHANGE UP (ref 22–31)
CREAT SERPL-MCNC: 0.53 MG/DL — SIGNIFICANT CHANGE UP (ref 0.5–1.3)
EGFR: 113 ML/MIN/1.73M2 — SIGNIFICANT CHANGE UP
GLUCOSE BLDC GLUCOMTR-MCNC: 188 MG/DL — HIGH (ref 70–99)
GLUCOSE BLDC GLUCOMTR-MCNC: 251 MG/DL — HIGH (ref 70–99)
GLUCOSE BLDC GLUCOMTR-MCNC: 294 MG/DL — HIGH (ref 70–99)
GLUCOSE BLDC GLUCOMTR-MCNC: 321 MG/DL — HIGH (ref 70–99)
GLUCOSE SERPL-MCNC: 235 MG/DL — HIGH (ref 70–99)
HCT VFR BLD CALC: 36.9 % — LOW (ref 39–50)
HGB BLD-MCNC: 12.9 G/DL — LOW (ref 13–17)
MCHC RBC-ENTMCNC: 31.5 PG — SIGNIFICANT CHANGE UP (ref 27–34)
MCHC RBC-ENTMCNC: 35 GM/DL — SIGNIFICANT CHANGE UP (ref 32–36)
MCV RBC AUTO: 90.2 FL — SIGNIFICANT CHANGE UP (ref 80–100)
PLATELET # BLD AUTO: 347 K/UL — SIGNIFICANT CHANGE UP (ref 150–400)
POTASSIUM SERPL-MCNC: 4 MMOL/L — SIGNIFICANT CHANGE UP (ref 3.5–5.3)
POTASSIUM SERPL-SCNC: 4 MMOL/L — SIGNIFICANT CHANGE UP (ref 3.5–5.3)
RBC # BLD: 4.09 M/UL — LOW (ref 4.2–5.8)
RBC # FLD: 11.7 % — SIGNIFICANT CHANGE UP (ref 10.3–14.5)
SODIUM SERPL-SCNC: 136 MMOL/L — SIGNIFICANT CHANGE UP (ref 135–145)
VANCOMYCIN TROUGH SERPL-MCNC: 2.8 UG/ML — LOW (ref 10–20)
WBC # BLD: 8.85 K/UL — SIGNIFICANT CHANGE UP (ref 3.8–10.5)
WBC # FLD AUTO: 8.85 K/UL — SIGNIFICANT CHANGE UP (ref 3.8–10.5)

## 2023-09-15 PROCEDURE — 99233 SBSQ HOSP IP/OBS HIGH 50: CPT

## 2023-09-15 PROCEDURE — 73220 MRI UPPR EXTREMITY W/O&W/DYE: CPT | Mod: 26,LT

## 2023-09-15 RX ORDER — VANCOMYCIN HCL 1 G
1250 VIAL (EA) INTRAVENOUS EVERY 12 HOURS
Refills: 0 | Status: DISCONTINUED | OUTPATIENT
Start: 2023-09-15 | End: 2023-09-23

## 2023-09-15 RX ADMIN — Medication 6: at 17:31

## 2023-09-15 RX ADMIN — Medication 3: at 21:04

## 2023-09-15 RX ADMIN — Medication 166.67 MILLIGRAM(S): at 21:04

## 2023-09-15 RX ADMIN — Medication 250 MILLIGRAM(S): at 10:05

## 2023-09-15 RX ADMIN — Medication 8: at 11:35

## 2023-09-15 RX ADMIN — ENOXAPARIN SODIUM 40 MILLIGRAM(S): 100 INJECTION SUBCUTANEOUS at 21:05

## 2023-09-15 RX ADMIN — Medication 2: at 08:23

## 2023-09-15 RX ADMIN — INSULIN GLARGINE 20 UNIT(S): 100 INJECTION, SOLUTION SUBCUTANEOUS at 21:05

## 2023-09-15 RX ADMIN — Medication 1 APPLICATION(S): at 10:00

## 2023-09-15 RX ADMIN — LISINOPRIL 5 MILLIGRAM(S): 2.5 TABLET ORAL at 10:00

## 2023-09-15 RX ADMIN — CEFTRIAXONE 2000 MILLIGRAM(S): 500 INJECTION, POWDER, FOR SOLUTION INTRAMUSCULAR; INTRAVENOUS at 10:00

## 2023-09-15 NOTE — PROGRESS NOTE ADULT - SUBJECTIVE AND OBJECTIVE BOX
Patient seen and examined at bedside. Patient states that his pain is slighty better but overall unchanged from yesterday. Denies numbness/tingling in the non necrotic aspect of the affected extremity.    LABS:                        11.2   6.93  )-----------( 296      ( 14 Sep 2023 07:31 )             31.7     09-14    140  |  108  |  8   ----------------------------<  266<H>  3.0<L>   |  26  |  0.46<L>    Ca    8.6      14 Sep 2023 07:31  Mg     1.9     09-14    TPro  6.5  /  Alb  2.8<L>  /  TBili  0.4  /  DBili  x   /  AST  26  /  ALT  43  /  AlkPhos  94  09-14    PT/INR - ( 14 Sep 2023 07:31 )   PT: 11.3 sec;   INR: 1.00 ratio         PTT - ( 14 Sep 2023 07:31 )  PTT:27.2 sec  Urinalysis Basic - ( 14 Sep 2023 07:31 )    Color: x / Appearance: x / SG: x / pH: x  Gluc: 266 mg/dL / Ketone: x  / Bili: x / Urobili: x   Blood: x / Protein: x / Nitrite: x   Leuk Esterase: x / RBC: x / WBC x   Sq Epi: x / Non Sq Epi: x / Bacteria: x      VITAL SIGNS:  T(C): 36.6 (09-14-23 @ 23:09), Max: 37.1 (09-14-23 @ 16:18)  HR: 93 (09-14-23 @ 23:09) (84 - 100)  BP: 135/88 (09-14-23 @ 23:09) (121/74 - 135/88)  RR: 18 (09-14-23 @ 23:09) (18 - 18)  SpO2: 94% (09-14-23 @ 23:09) (94% - 98%)      PHYSICAL EXAM  General: NAD    LUE:   Dressings c/d/i  Mild tenderness to palpation over the 3rd digit  Minimal tenderness over the dorsum of the hand  Minimal fluctuance extending to PIP with minimal drainage  motor grossly intact throughout axillary/musculocutaenous/radial/median/ulnar nerves  Sensory: SILT +AIN/PIN/med/uln/rad  + radial pulse      IMAGING:  XR : please refer to official report    Assessment/Plan:  63y Male with necrotic left third distal phalanx, cellulitis 3rd digit and dorsum of hand    -Digit will auto amputate, no urgent surgical intervention at present; can consider revision amputation on an outpatient basis; Patient at high risk for surgical complications given suspected uncontrolled DM  -IV abx for 3rd digit and hand cellulitis, per ID; suspected purulent drainage vs granulation tissue from 3rd finger  -Do not suspect flexor teno as digit is draining, no flexor posturing, no pain w/ extension; +swelling diffusely (not fusiform); +tenderness along tendon sheath, however diffusely mildly tender  -Pain control as needed  -DVT ppx per primary   -NWB L Hand  -Rec IV abx per primary vs ID  -Glycemic control; A1c >15, GLucose >300s  -FU AM Labs  -FU BCx (Rec)  -ESR/CRP: 86/96  -Will monitor     Discussed with Dr. Garcia who is aware and agrees with above plan,.

## 2023-09-15 NOTE — DIETITIAN INITIAL EVALUATION ADULT - NSFNSGIIOFT_GEN_A_CORE
I&O's Detail    14 Sep 2023 07:01  -  15 Sep 2023 07:00  --------------------------------------------------------  IN:    IV PiggyBack: 250 mL  Total IN: 250 mL    OUT:  Total OUT: 0 mL    Total NET: 250 mL

## 2023-09-15 NOTE — DIETITIAN INITIAL EVALUATION ADULT - OTHER INFO
Pt is a 64 yo male who denies any medical problems, who presents to ED due to worsening infection of left hand. Pt states he injured his hand about three months ago and did not see a doctor or go to hospital due to lack of insurance/funds. Pt states he was hurt and since has been treating at home with salves, home remedies but nothing helps. Pt endorses having chills and feeling ill but not sure if febrile.  Admit for osteomyelitis and cellulitis of left hand    W/ L 3rd finger tip necrosis w/ finger & hand cellulitis; patient at high risk for surgical complications given suspected uncontrolled DM. Likely qualifies for FAH Program - pending social work consult. Unable to obtain diet/wt hx 2/2 unarousable. W/ T2DM - hgb A1C of >15.5% and POCTs elevated x 24 hrs (305, 285, 188); given 20 units of lantus and 23 units of insulin sliding scale x 24 hrs. Bed scale wt of 131# taken by RD on 9/15/23. NFPE reveals mild-moderate muscle/ fat wasting - pt appears thin. C/w CHO consistent diet at this time; ? unsure why pt is on soft and bite sized consistency diet - suggest SLP consult to confirm consistency of diet. Add premier protein shake BID (provides 160kcal, 30g protein). Staff dietitian provided verbal and written nutrition education on T2DM - pt is receptive. Suggest social work consult to confirm if pt qualifies for FAH program. See below for other recommendations.

## 2023-09-15 NOTE — DIETITIAN INITIAL EVALUATION ADULT - LITERATURE/VIDEOS GIVEN
Neonatology Daily Progress Note    Baby Kanchan Logan is a 5 Days female infant  MRN: [id number]  Gestational Age: 25w6d  Birth weight: 770 g     DOL: 5 days    PMA: 26w4d    HOSPITAL PROBLEMS:  Active Problems:    Prematurity, 750-999 grams, 25-26 completed weeks    RDS (respiratory distress syndrome in the )    Observation and evaluation of  for suspected infectious condition    Anemia    Apnea of prematurity  Resolved Problems:    * No resolved hospital problems. *       INTERVAL EVENTS SINCE PREVIOUS NOTE:  No acute events overnight    PHYSICAL EXAM    Vital signs:     Vital Last Value 24 Hour Range   Temperature 98.1 °F (36.7 °C) (21) Temp  Min: 97.7 °F (36.5 °C)  Max: 98.6 °F (37 °C)   Pulse (!) 182 (21) Pulse  Min: 149  Max: 183   Respiratory 45 (21) Resp  Min: 28  Max: 71   Non-Invasive  Blood Pressure (!) 74/23 (21) BP  Min: 74/23  Max: 74/23   Pulse Oximetry 96 % (21) SpO2  Min: 88 %  Max: 98 %   Arterial   Blood Pressure (!) 37/25 (21) Arterial Line BP  Min: 37/25  Max: 55/26     General: respiratory acute distress, responds to stimuli  HENT: AFSOF, normocephalic, ears normally set, no cleft, palate intact  Neck: supple, full range of motion  CV: RRR, no murmurs, 2+ pulses, good perfusion  Lungs: clear and equal bilaterally, no retractions, no nasal flaring  Abdomen: soft, non-tender, non-distended, no organomegaly  Extremities: negative O/B; FROM x 4  Neuro: tone appropriate for GA  Skin: no rash  : normal for GA; anus patent  Back: no juan f, no dimples      Labs (Last 24 hours)  Recent Results (from the past 24 hour(s))   GLUCOSE, BEDSIDE - POINT OF CARE    Collection Time: 21  5:22 PM   Result Value Ref Range    GLUCOSE, BEDSIDE - POINT OF CARE 223 (HH) 47 - 110 mg/dL   BLOOD GAS, ARTERIAL WITH COOXIMETRY - RESPIRATORY    Collection Time: 21  5:25 PM   Result Value Ref Range    BASE EXCESS / DEFICIT, ARTERIAL -  RESPIRATORY -2 -2 - 3 mmol/L    HCO3, ARTERIAL - RESPIRATORY 27 22 - 28 mmol/L    O2 CONTENT, ARTERIAL - RESPIRATORY 12 (L) 15 - 23 %    PCO2, ARTERIAL - RESPIRATORY 68 (HH) 32 - 45 mm Hg    PH, ARTERIAL - RESPIRATORY 7.21 (LL) 7.35 - 7.45 Units    PO2, ARTERIAL - RESPIRATORY 56 (L) 83 - 108 mm Hg    O2 SATURATION, ARTERIAL - RESPIRATORY 94 (L) 95 - 99 %    CONDITION - RESPIRATORY SIMV PC PS PIP16 +5 R25 PS7 28%     CARBOXYHEMOGLOBIN - RESPIRATORY 2.2 1.5 - 15.0 %    HEMOGLOBIN - RESPIRATORY 9.5 (L) 14.5 - 22.5 g/dL    METHEMOGLOBIN - RESPIRATORY 0.9 <=1.6 %    OXYHEMOGLOBIN, ARTERIAL - RESPIRATORY 90.5 (L) 94.0 - 98.0 %    P/F RATIO - RESPIRATORY 200 (L) 300 - 500    SITE - RESPIRATORY Arterial Line     TEMPERATURE - RESPIRATORY 37.0 degrees   POTASSIUM - RESPIRATORY    Collection Time: 04/18/21  5:25 PM   Result Value Ref Range    POTASSIUM - RESPIRATORY 4.5 3.5 - 6.0 mmol/L   SODIUM - RESPIRATORY    Collection Time: 04/18/21  5:25 PM   Result Value Ref Range    SODIUM - RESPIRATORY 134 (L) 135 - 145 mmol/L   CALCIUM, IONIZED - RESPIRATORY    Collection Time: 04/18/21  5:25 PM   Result Value Ref Range    CALCIUM, IONIZED - RESPIRATORY 1.38 (H) 1.15 - 1.29 mmol/L   GLUCOSE, BEDSIDE - POINT OF CARE    Collection Time: 04/18/21  9:11 PM   Result Value Ref Range    GLUCOSE, BEDSIDE - POINT OF CARE 199 (H) 47 - 110 mg/dL   BLOOD GAS, ARTERIAL - RESPIRATORY    Collection Time: 04/18/21  9:13 PM   Result Value Ref Range    BASE EXCESS / DEFICIT, ARTERIAL - RESPIRATORY -2 -2 - 3 mmol/L    HCO3, ARTERIAL - RESPIRATORY 27 22 - 28 mmol/L    PCO2, ARTERIAL - RESPIRATORY 65 (HH) 32 - 45 mm Hg    PH, ARTERIAL - RESPIRATORY 7.23 (LL) 7.35 - 7.45 Units    PO2, ARTERIAL - RESPIRATORY 67 (L) 83 - 108 mm Hg    O2 SATURATION, ARTERIAL - RESPIRATORY 89 (L) 95 - 99 %    CONDITION - RESPIRATORY SIMV PC PS PIP17 R30 +5 PS7 28%     SITE - RESPIRATORY Arterial Line     TEMPERATURE - RESPIRATORY 37.0 degrees   GLUCOSE, BEDSIDE - POINT OF  CARE    Collection Time: 04/19/21 12:32 AM   Result Value Ref Range    GLUCOSE, BEDSIDE - POINT OF CARE 112 (H) 47 - 110 mg/dL   BLOOD GAS, ARTERIAL - RESPIRATORY    Collection Time: 04/19/21 12:35 AM   Result Value Ref Range    BASE EXCESS / DEFICIT, ARTERIAL - RESPIRATORY -1 -2 - 3 mmol/L    HCO3, ARTERIAL - RESPIRATORY 26 22 - 28 mmol/L    PCO2, ARTERIAL - RESPIRATORY 52 (H) 32 - 45 mm Hg    PH, ARTERIAL - RESPIRATORY 7.31 (L) 7.35 - 7.45 Units    PO2, ARTERIAL - RESPIRATORY 65 (L) 83 - 108 mm Hg    O2 SATURATION, ARTERIAL - RESPIRATORY 90 (L) 95 - 99 %    CONDITION - RESPIRATORY SIMV PC PS PIP18 R35 +5 PS7 28%     P/F RATIO - RESPIRATORY 232 (L) 300 - 500    SITE - RESPIRATORY Arterial Line     TEMPERATURE - RESPIRATORY 37.0 degrees   BLOOD GAS, ARTERIAL WITH COOXIMETRY - RESPIRATORY    Collection Time: 04/19/21  5:19 AM   Result Value Ref Range    BASE EXCESS / DEFICIT, ARTERIAL - RESPIRATORY -2 -2 - 3 mmol/L    HCO3, ARTERIAL - RESPIRATORY 25 22 - 28 mmol/L    O2 CONTENT, ARTERIAL - RESPIRATORY 12 (L) 15 - 23 %    PCO2, ARTERIAL - RESPIRATORY 48 (H) 32 - 45 mm Hg    PH, ARTERIAL - RESPIRATORY 7.32 (L) 7.35 - 7.45 Units    PO2, ARTERIAL - RESPIRATORY 60 (L) 83 - 108 mm Hg    O2 SATURATION, ARTERIAL - RESPIRATORY 97 95 - 99 %    CONDITION - RESPIRATORY SIMV PC PS PIP18 R35 +5 PS7 24%     CARBOXYHEMOGLOBIN - RESPIRATORY 2.2 1.5 - 15.0 %    HEMOGLOBIN - RESPIRATORY 9.3 (L) 14.5 - 22.5 g/dL    METHEMOGLOBIN - RESPIRATORY 1.3 <=1.6 %    OXYHEMOGLOBIN, ARTERIAL - RESPIRATORY 93.1 (L) 94.0 - 98.0 %    P/F RATIO - RESPIRATORY 250 (L) 300 - 500    SITE - RESPIRATORY Arterial Line     TEMPERATURE - RESPIRATORY 37.0 degrees   POTASSIUM - RESPIRATORY    Collection Time: 04/19/21  5:19 AM   Result Value Ref Range    POTASSIUM - RESPIRATORY 3.9 3.5 - 6.0 mmol/L   SODIUM - RESPIRATORY    Collection Time: 04/19/21  5:19 AM   Result Value Ref Range    SODIUM - RESPIRATORY 134 (L) 135 - 145 mmol/L   CALCIUM, IONIZED -  RESPIRATORY    Collection Time: 04/19/21  5:19 AM   Result Value Ref Range    CALCIUM, IONIZED - RESPIRATORY 1.33 (H) 1.15 - 1.29 mmol/L   GLUCOSE, BEDSIDE - POINT OF CARE    Collection Time: 04/19/21  5:19 AM   Result Value Ref Range    GLUCOSE, BEDSIDE - POINT OF CARE 146 (H) 47 - 110 mg/dL   Bilirubin Panel    Collection Time: 04/19/21  5:20 AM   Result Value Ref Range    Bilirubin, Total 3.9 2.0 - 6.0 mg/dL    Bilirubin, Direct 0.4 0.0 - 0.6 mg/dL          ASSESSMENT AND PLAN BY SYSTEM       FLUID ELECTROLYTES NUTRITION     Weight Length Head circumference      Wt Readings from Last 2 Encounters:   04/18/21 (!) 630 g (12 %, Z= -1.18)*     * Growth percentiles are based on Sonia (Girls, 22-50 Weeks) data.      current - 11.02\" (28 cm) current - 20.5 cm (8.07\")   Weight change: -33 g     Down 18% from BW     Dosing Weight: 800 g          Recent Labs   Lab 04/18/21  0429   SODIUM 136   POTASSIUM 4.6   CHLORIDE 103   CO2 26   BUN 32*   CREATININE 0.53   GLUCOSE 145*   CALCIUM 9.1   PHOS 3.3*   MG 2.4   TRIGLYCERIDE 80*       POC GLUCOSE:     Recent Labs   Lab 04/18/21  1722 04/18/21  2111 04/19/21  0032 04/19/21  0519   GLUCOSE BEDSIDE 223* 199* 112* 146*       Alkaline Phosphatase:  No results found       INPUT:    IVF:   Art line @ 0.5 ml/h    Total Fluids written for 140 ml/kg/day  TPN: D 10% P: 2 grams/kg/day L 2 grams/kg/day                @ 1.8 mls/hr   Feeding:     MBM; Mother contemplating DBM              6ml  Q3h          GIR: 4.88 mg/kg/min        % PO: 0         TPN/IVF WORKSHEET:                                   Dosing Weight: 800 g      Ordered TFV 140ml/kg/d x 0.8kg = 112ml/d This value must be entered in Total Fluid Volume order      - IL @ 2 0.32ml/hr = 7.68ml/d       - UAC/UVC fluids @ 0ml/hr = 0ml/d       - Other drips @ 0ml/hr = 0ml/d       - Feeds  6ml q3hr = 48ml/d       = TPN volume  55.2ml/d  = 2.3ml/hr         OUTPUT:    Intake/Output       04/18 0700 - 04/19 0659 04/19 0700 - 04/20 0659     I.V. (mL/kg) 12.5 (19.84) 0.5 (0.79)    NG/GT (mL/kg) 24 (38.1)     TPN (mL/kg) 74.37 (118.05) 2.44 (3.87)    Total Intake(mL/kg) 110.87 (175.98) 2.94 (4.67)    Urine (mL/kg/hr) 89 (5.89)     Blood 1.1     Total Output(mL/kg) 90.1 (143.02)     Net +20.77 +2.94          Urine Occurrence 8 x              Assessment:    infant    S/p calcium gluconate     Plan:  - peripheral TPN:  Advance to , D11, L2, P3.5  - feeds to 6ml q3h DBM add prolacta  - TPN labs AM    CENTRAL LINE AND CATHETER DISCUSSION     Central Line  Type of Central Line:   UAC Line 21 (Active)       UVC Double Lumen 21 (Active)     Line Functioning appropriately: Yes  Necessity/Indication: TPN and IV Medications, Limited Peripheral Access  Continued need for Central Line discussed:  2021     Pull back PICC by 2 cm  Remove UAC     Urinary Catheter  Necessity/Indication: N/A  Continued need for Urinary Catheter discussed: N/A        PAIN/SEDATION     NPASS Pain Score  Min: 1  Max: 3    Pain/Sedation Medications: None in use and Morphine  0.05 mg/kg Q4h PRN    Plan:   - Consider adding continuous   - continue to monitor N-PASS scores  - Continue morphine 0.05mg/kg q4h prn        BILIRUBIN     Assessment: At risk for:Hyperbilirubinemia of Prematurity    Baby's blood type: O+ Jasmina negative No results found  Maternal blood type  Unknown This patient's mother is not on file.Antibody: This patient's mother is not on file.    Last Bilirubin:   Bilirubin, Total (mg/dL)   Date Value   2021   2021     Photothx 4/15-  Tbili 3.9 @ 88 HOL (Dbili 0.5)   Tbili 3.9 @ 112 HOL (Dbili 0.4)    Light level: 5 mg/dL - extrapolated from Logan preemie bili recs for 27wga at 48HOL  Exchange level: 11 mg/dL - extrapolated from Logan preemie bili recs for 27wga at 48HOL      Plan:   - Check Bilirubin level in the AM   - Discontinue phototherapy       APNEA/BRADYCARDIA/DESATURATION     Assessment: At Risk for Apnea  of Prematurity    24H Events:     None    Last Significant Event: none       -Medications: - Caffeine maintenance 10mg/kg qday     Plan:   - continue to monitor clinically      RESPIRATORY     Most recent blood gas:     ARTERIAL BLOOD GAS:  Lab Results   Component Value Date/Time    RAPH 7.32 (L) 2021 05:19 AM    RAPCO2 48 (H) 2021 05:19 AM    RAPO2 60 (L) 2021 05:19 AM    RAHCO2021 05:19 AM    RALA 2021 05:18 AM         Mode: SIMV PC/PS         VENTILATOR SETTINGS:    SETTING LAST VALUE   Mode  SIMV-Pressure Control (21)   FiO2 30 % (21 0600)   Rate 35 (21)   Tidal Volume     PIP 18 cm H2O (21)   PEEP/CPAP 5 cm H20 (21)   Pressure Support 7 cm H20 (21)       Ventilator days: -   Days on CPAP/HFNC: NA  Days on Oxygen: -     Assessment: Respiratory Distress Syndrome (surfactant deficiency)    Intubation  Necessity/Indication: Acidosis and surfactant administration  Readiness for Extubation discussed: Yes 2021    Plan:   - continue SpO2 monitoring  - s/p Curosurf x2 doses  - q12h blood gases      CARDIOVASCULAR     Assessment: No active cardiac issues     No procedure found.     Plan:  - consider Echo to evaluate for PDA in future      HEMATOLOGY     Assessment: Anemia    Recent Labs   Lab 04/15/21  0544   WBC 29.5   HGB 9.0*   HCT 27.7*          Transfusions:   PRBC: 10/kg (4/15)   Platelet: None    FFP: None     - Transfused 10 ml/kg (4/15) -> resp Hgb  = 11.3  - COOX today 9.3 (down from 9.5)    Plan:  - monitor clinically  - Transfuse today PRBC 15 ml/kg      INFECTIOUS DISEASE     Assessment: Observation/Evaluation of  for possible sepsis    Recent Labs   Lab 04/15/21  0544   WBC 29.5      NRBCRE 25*       Microbiology Results  (Last 10 results in the past 7 days)    Specimen   Gram Smear   Culture Result   Status       21         Adequate quality specimen. Normal  pulmonary macrophages and bronchial cells. Routine culture in progress.                 - Blood culture and CBC on admission: YES   - Initial antibiotic course: Amp + Gent + Fluconazole (started  on admission)    Gent trough  <0.2     Plan:  - f/u BCx () - NGTD  - f/u trach aspirate () - NGTD  - Continue amp/gent/fluconazole for 7d as there is high concern for intrauterine infection  - Called OSH  - NGTD X48 hours    NEUROLOGY     Assessment: Premature infant at risk for IVH    Head Ultrasound Day of LIfe 7:   Head Ultrasound Day of Life 28:     Plan:   - continue to monitor clinically  - f/u mec tox ()  - Utox +THC ()      OPHTHALMOLOGY     Assessment: At risk for Retinopathy of Prematurity    Awaiting first exam  Last Ophthalmologic exam:    Next Ophthalmologic exam due: 21 (04/15/21 0800)    Plan:   - ROP Exam as per unit policy @ 4-6 weeks of age      MEDICATIONS     Current Facility-Administered Medications   Medication   • fat emulsion 20 % 3.3 mL /pediatric infusion   • parenteral nutrition /pediatric (less than 5 kg)   • ampicillin 80 mg in NS pediatric IV syringe   • heparin (porcine) 10 UNIT/ML lock flush 5 Units   • morphine PF (ASTRAMORPH PF) 0.2 mg/mL (PF)  injection 0.04 mg   • gentamicin 4 mg in sodium chloride 0.9% riddhi/ped IVPB syringe   • caffeine citrate (CAFCIT) 8 mg in D5W /peds IV syringe   • ARTERIAL line standard: sodium acetate 7.8 mEq-heparin 0.5 unit/mL infusion ()   • fluconazole (DIFLUCAN) 4.8 mg in sodium chloride 0.9% IVPB syringe   • sodium chloride 0.45 % flush pediatric syringe 0.5 mL        DISCHARGE TASKS     Infant 34 weeks GA? No    HEALTH MAINTENANCE AND DISCHARGE PLANNING     Immunizations:   There is no immunization history on file for this patient.  Dickson Hearing Test:      Dickson ROP Eye Exam Needed?: Yes (04/15/21 0800) Yes    Car Seat Screen:      CCHD Screening:   Screening complete:    Right  hand reading %:    Foot reading %:    CHD:      Circumcision:       State Screen- date drawn (most recent results):    Last 3 results:      Synagis Candidate:      Pediatrician: BETSY    PARENTAL COMMUNICATION     Family present during rounds:   Parents last updated by phone on 4/15   Provided nutrition education hand-out from [facility] by Staff Dietitian

## 2023-09-15 NOTE — DIETITIAN INITIAL EVALUATION ADULT - ADD RECOMMEND
1) C/w CHO consistent diet w/ soft & bite sized consistency for now  2) Suggest SLP consult to confirm consistency of diet   3) Add premier protein shake BID (provides 160kcal, 30g protein)  4) Maintain -180mg/dL  5) Monitor bowel movements, if no BM for >3 days, consider implementing bowel regimen.  6) Encourage protein-rich foods, maximize food preferences  7) MVI w/ minerals daily to ensure 100% RDA met  8) Consider adding thiamine 100 mg daily 2/2 poor PO intake/ malnutrition  9) Suggest social work consult 2/2 qualification for FAH Program  10) Confirm goals of care regarding nutrition support  11) Encourage f/u with outpatient dietitian and endocrinologist for diabetes management  RD will continue to monitor PO intake, labs, hydration, and wt prn.

## 2023-09-15 NOTE — DIETITIAN NUTRITION RISK NOTIFICATION - TREATMENT: THE FOLLOWING DIET HAS BEEN RECOMMENDED
Diet, Soft and Bite Sized:   Consistent Carbohydrate {Evening Snack} (CSTCHOSN) (09-14-23 @ 10:21) [Active]

## 2023-09-15 NOTE — DIETITIAN INITIAL EVALUATION ADULT - ORAL INTAKE PTA/DIET HISTORY
Is Bulgarian Speaking. Unable to obtain information 2/2 unarousable. As per EMR, with poor health/uncontrolled T2DM due to lack of funds/housing/employment

## 2023-09-15 NOTE — DIETITIAN INITIAL EVALUATION ADULT - NAME AND PHONE
Jessica Barkley, Staff Dietitian   Kelley Zamarripa M.S., Registered Dietitian Nutritionist [phone]

## 2023-09-15 NOTE — DIETITIAN INITIAL EVALUATION ADULT - DIET TYPE
Suggest SLP consult to confirm consistency of diet - will keep soft and bite sized consistency for now/supplement (specify)

## 2023-09-15 NOTE — DIETITIAN INITIAL EVALUATION ADULT - ETIOLOGY
r/t inability to meet increased needs 2/2 likely w/ financial insecurity/w/ of funds/housing/employment, uncontrolled T2DM

## 2023-09-15 NOTE — PROGRESS NOTE ADULT - SUBJECTIVE AND OBJECTIVE BOX
HPI: Pt is a 64 yo male who denies any medical problems, who presents to ED due to worsening infection of left hand. Pt states he injured his hand about three months ago and did not see a doctor or go to hospital due to lack of insurance/funds. Pt states he was hurt and since has been treating at home with salves, home remedies but nothing helps. Pt endorses having chills and feeling ill but not sure if febrile. States there is a discharge from the hand that is at times malodorous. States he is also having pain but not since his hand being wrapped in ED.     9/15: no new complaints  MRI left hand      PHYSICAL EXAM:    Daily     Daily     Vital Signs Last 24 Hrs  T(C): 37 (15 Sep 2023 14:59), Max: 37.1 (14 Sep 2023 16:18)  T(F): 98.6 (15 Sep 2023 14:59), Max: 98.8 (14 Sep 2023 16:18)  HR: 95 (15 Sep 2023 14:59) (87 - 100)  BP: 119/72 (15 Sep 2023 14:59) (105/74 - 135/88)  BP(mean): --  RR: 18 (15 Sep 2023 14:59) (18 - 18)  SpO2: 94% (15 Sep 2023 14:59) (94% - 99%)    Constitutional: Weak  appearing  HEENT: Atraumatic, NOAH,   Respiratory: Breath Sounds normal, no rhonchi/wheeze  Cardiovascular: N S1S2;   Gastrointestinal: Abdomen soft, non tender, Bowel Sounds present  Extremities: left hand edema, 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; peripheral pulses present  Neurological: AAO x 3, no gross focal motor deficits  Skin: Non cellulitic, no rash, ulcers  Lymph Nodes: No lymphadenopathy noted  Back: No CVA tenderness   Musculoskeletal: non tender  Breasts: Deferred  Genitourinary: deferred  Rectal: Deferred    All Labs/EKG/Radiology/Meds reviewed by me                          12.9   8.85  )-----------( 347      ( 15 Sep 2023 06:24 )             36.9       CBC Full  -  ( 15 Sep 2023 06:24 )  WBC Count : 8.85 K/uL  RBC Count : 4.09 M/uL  Hemoglobin : 12.9 g/dL  Hematocrit : 36.9 %  Platelet Count - Automated : 347 K/uL  Mean Cell Volume : 90.2 fl  Mean Cell Hemoglobin : 31.5 pg  Mean Cell Hemoglobin Concentration : 35.0 gm/dL  Auto Neutrophil # : x  Auto Lymphocyte # : x  Auto Monocyte # : x  Auto Eosinophil # : x  Auto Basophil # : x  Auto Neutrophil % : x  Auto Lymphocyte % : x  Auto Monocyte % : x  Auto Eosinophil % : x  Auto Basophil % : x      09-15    136  |  104  |  11  ----------------------------<  235<H>  4.0   |  26  |  0.53    Ca    9.3      15 Sep 2023 06:24  Mg     1.9     09-14    TPro  6.5  /  Alb  2.8<L>  /  TBili  0.4  /  DBili  x   /  AST  26  /  ALT  43  /  AlkPhos  94  09-14      LIVER FUNCTIONS - ( 14 Sep 2023 07:31 )  Alb: 2.8 g/dL / Pro: 6.5 gm/dL / ALK PHOS: 94 U/L / ALT: 43 U/L / AST: 26 U/L / GGT: x             PT/INR - ( 14 Sep 2023 07:31 )   PT: 11.3 sec;   INR: 1.00 ratio         PTT - ( 14 Sep 2023 07:31 )  PTT:27.2 sec          Urinalysis Basic - ( 15 Sep 2023 06:24 )    Color: x / Appearance: x / SG: x / pH: x  Gluc: 235 mg/dL / Ketone: x  / Bili: x / Urobili: x   Blood: x / Protein: x / Nitrite: x   Leuk Esterase: x / RBC: x / WBC x   Sq Epi: x / Non Sq Epi: x / Bacteria: x      < from: MR Hand w/wo IV Cont, Left (09.15.23 @ 12:57) >  IMPRESSION:  Limited study with MRI artifact limiting evaluation of the exposed bone   related to extensively denuded soft tissue involving the third digit at   the level of the distal phalanx. Osteomyelitis is not appreciated, but is   also definitely not excluded on the basis of this exam.    Extensive soft tissue edema of the third digit, with moderate flexor   tendon tenosynovitis most notable at the level of the third metacarpal.    < end of copied text >        MEDICATIONS  (STANDING):  bacitracin   Ointment 1 Application(s) Topical two times a day  cefTRIAXone Injectable. 2000 milliGRAM(s) IV Push every 24 hours  dextrose 5%. 1000 milliLiter(s) (50 mL/Hr) IV Continuous <Continuous>  dextrose 5%. 1000 milliLiter(s) (100 mL/Hr) IV Continuous <Continuous>  dextrose 50% Injectable 12.5 Gram(s) IV Push once  dextrose 50% Injectable 25 Gram(s) IV Push once  dextrose 50% Injectable 25 Gram(s) IV Push once  enoxaparin Injectable 40 milliGRAM(s) SubCutaneous every 24 hours  glucagon  Injectable 1 milliGRAM(s) IntraMuscular once  influenza   Vaccine 0.5 milliLiter(s) IntraMuscular once  insulin glargine Injectable (LANTUS) 20 Unit(s) SubCutaneous at bedtime  insulin lispro (ADMELOG) corrective regimen sliding scale   SubCutaneous three times a day before meals  insulin lispro (ADMELOG) corrective regimen sliding scale   SubCutaneous at bedtime  lisinopril 5 milliGRAM(s) Oral daily  vancomycin  IVPB 1250 milliGRAM(s) IV Intermittent every 12 hours    MEDICATIONS  (PRN):  acetaminophen     Tablet .. 650 milliGRAM(s) Oral every 6 hours PRN Temp greater or equal to 38C (100.4F), Mild Pain (1 - 3)  dextrose Oral Gel 15 Gram(s) Oral once PRN Blood Glucose LESS THAN 70 milliGRAM(s)/deciliter  melatonin 3 milliGRAM(s) Oral at bedtime PRN Insomnia  ondansetron Injectable 4 milliGRAM(s) IV Push every 8 hours PRN Nausea and/or Vomiting  oxycodone    5 mG/acetaminophen 325 mG 1 Tablet(s) Oral every 4 hours PRN Severe Pain (7 - 10)

## 2023-09-15 NOTE — PROGRESS NOTE ADULT - ASSESSMENT
Pt is a 64 yo male with no known pmhx, admitted due to:    # ?Osteomyelitis and cellulitis of left hand + Necrosis of left 3rd finger tip + Flexor Tenosynovitis of 3rd digit:   admit to medicine  ID consult  Ortho hand consult appreciated, recs implemented  ESR elevated  c/w Vanco/Zosyn; vanco dose increased  f/u blood cultures  Tylenol prn pain, fever  Percocet prn severe pain  Ambulate as tolerated  MRI left hand done with and without contrast; noted as above    #New onset Diabetes mellitus  AISS  accuchecks qAC/HS  carb restriction  diet modification discussed  will need nutritional counseling  f/u A1c  Titrate insulin accordingly    #Chin laceration   Bacitracin bid    Social work consult- pt with poor health due to lack of funds/housing/employment    DVt PPX: lovenox

## 2023-09-15 NOTE — DIETITIAN INITIAL EVALUATION ADULT - PERTINENT LABORATORY DATA
09-15    136  |  104  |  11  ----------------------------<  235<H>  4.0   |  26  |  0.53    Ca    9.3      15 Sep 2023 06:24  Mg     1.9     09-14    TPro  6.5  /  Alb  2.8<L>  /  TBili  0.4  /  DBili  x   /  AST  26  /  ALT  43  /  AlkPhos  94  09-14  POCT Blood Glucose.: 188 mg/dL (09-15-23 @ 07:35)  A1C with Estimated Average Glucose Result: >15.5 % (09-14-23 @ 07:31)  A1C with Estimated Average Glucose Result: >15.5 % (09-13-23 @ 21:13)

## 2023-09-15 NOTE — DIETITIAN INITIAL EVALUATION ADULT - PERTINENT MEDS FT
MEDICATIONS  (STANDING):  bacitracin   Ointment 1 Application(s) Topical two times a day  cefTRIAXone Injectable. 2000 milliGRAM(s) IV Push every 24 hours  dextrose 5%. 1000 milliLiter(s) (100 mL/Hr) IV Continuous <Continuous>  dextrose 5%. 1000 milliLiter(s) (50 mL/Hr) IV Continuous <Continuous>  dextrose 50% Injectable 12.5 Gram(s) IV Push once  dextrose 50% Injectable 25 Gram(s) IV Push once  dextrose 50% Injectable 25 Gram(s) IV Push once  enoxaparin Injectable 40 milliGRAM(s) SubCutaneous every 24 hours  glucagon  Injectable 1 milliGRAM(s) IntraMuscular once  influenza   Vaccine 0.5 milliLiter(s) IntraMuscular once  insulin glargine Injectable (LANTUS) 20 Unit(s) SubCutaneous at bedtime  insulin lispro (ADMELOG) corrective regimen sliding scale   SubCutaneous at bedtime  insulin lispro (ADMELOG) corrective regimen sliding scale   SubCutaneous three times a day before meals  lisinopril 5 milliGRAM(s) Oral daily  vancomycin  IVPB 1000 milliGRAM(s) IV Intermittent every 12 hours    MEDICATIONS  (PRN):  acetaminophen     Tablet .. 650 milliGRAM(s) Oral every 6 hours PRN Temp greater or equal to 38C (100.4F), Mild Pain (1 - 3)  dextrose Oral Gel 15 Gram(s) Oral once PRN Blood Glucose LESS THAN 70 milliGRAM(s)/deciliter  melatonin 3 milliGRAM(s) Oral at bedtime PRN Insomnia  ondansetron Injectable 4 milliGRAM(s) IV Push every 8 hours PRN Nausea and/or Vomiting  oxycodone    5 mG/acetaminophen 325 mG 1 Tablet(s) Oral every 4 hours PRN Severe Pain (7 - 10)

## 2023-09-16 DIAGNOSIS — Z01.810 ENCOUNTER FOR PREPROCEDURAL CARDIOVASCULAR EXAMINATION: ICD-10-CM

## 2023-09-16 LAB
ANION GAP SERPL CALC-SCNC: 7 MMOL/L — SIGNIFICANT CHANGE UP (ref 5–17)
BUN SERPL-MCNC: 14 MG/DL — SIGNIFICANT CHANGE UP (ref 7–23)
CALCIUM SERPL-MCNC: 9.2 MG/DL — SIGNIFICANT CHANGE UP (ref 8.5–10.1)
CHLORIDE SERPL-SCNC: 99 MMOL/L — SIGNIFICANT CHANGE UP (ref 96–108)
CO2 SERPL-SCNC: 27 MMOL/L — SIGNIFICANT CHANGE UP (ref 22–31)
CREAT SERPL-MCNC: 0.56 MG/DL — SIGNIFICANT CHANGE UP (ref 0.5–1.3)
EGFR: 111 ML/MIN/1.73M2 — SIGNIFICANT CHANGE UP
GLUCOSE BLDC GLUCOMTR-MCNC: 205 MG/DL — HIGH (ref 70–99)
GLUCOSE BLDC GLUCOMTR-MCNC: 216 MG/DL — HIGH (ref 70–99)
GLUCOSE BLDC GLUCOMTR-MCNC: 220 MG/DL — HIGH (ref 70–99)
GLUCOSE BLDC GLUCOMTR-MCNC: 264 MG/DL — HIGH (ref 70–99)
GLUCOSE SERPL-MCNC: 242 MG/DL — HIGH (ref 70–99)
HCT VFR BLD CALC: 39 % — SIGNIFICANT CHANGE UP (ref 39–50)
HGB BLD-MCNC: 13.4 G/DL — SIGNIFICANT CHANGE UP (ref 13–17)
MCHC RBC-ENTMCNC: 31 PG — SIGNIFICANT CHANGE UP (ref 27–34)
MCHC RBC-ENTMCNC: 34.4 GM/DL — SIGNIFICANT CHANGE UP (ref 32–36)
MCV RBC AUTO: 90.3 FL — SIGNIFICANT CHANGE UP (ref 80–100)
PLATELET # BLD AUTO: 417 K/UL — HIGH (ref 150–400)
POTASSIUM SERPL-MCNC: 3.9 MMOL/L — SIGNIFICANT CHANGE UP (ref 3.5–5.3)
POTASSIUM SERPL-SCNC: 3.9 MMOL/L — SIGNIFICANT CHANGE UP (ref 3.5–5.3)
RBC # BLD: 4.32 M/UL — SIGNIFICANT CHANGE UP (ref 4.2–5.8)
RBC # FLD: 11.5 % — SIGNIFICANT CHANGE UP (ref 10.3–14.5)
SODIUM SERPL-SCNC: 133 MMOL/L — LOW (ref 135–145)
WBC # BLD: 8.04 K/UL — SIGNIFICANT CHANGE UP (ref 3.8–10.5)
WBC # FLD AUTO: 8.04 K/UL — SIGNIFICANT CHANGE UP (ref 3.8–10.5)

## 2023-09-16 PROCEDURE — 99223 1ST HOSP IP/OBS HIGH 75: CPT

## 2023-09-16 PROCEDURE — 93306 TTE W/DOPPLER COMPLETE: CPT | Mod: 26

## 2023-09-16 PROCEDURE — 99233 SBSQ HOSP IP/OBS HIGH 50: CPT

## 2023-09-16 RX ADMIN — Medication 1 APPLICATION(S): at 21:49

## 2023-09-16 RX ADMIN — ENOXAPARIN SODIUM 40 MILLIGRAM(S): 100 INJECTION SUBCUTANEOUS at 21:13

## 2023-09-16 RX ADMIN — INSULIN GLARGINE 20 UNIT(S): 100 INJECTION, SOLUTION SUBCUTANEOUS at 21:13

## 2023-09-16 RX ADMIN — Medication 3 MILLIGRAM(S): at 21:24

## 2023-09-16 RX ADMIN — LISINOPRIL 5 MILLIGRAM(S): 2.5 TABLET ORAL at 11:20

## 2023-09-16 RX ADMIN — CEFTRIAXONE 2000 MILLIGRAM(S): 500 INJECTION, POWDER, FOR SOLUTION INTRAMUSCULAR; INTRAVENOUS at 11:20

## 2023-09-16 RX ADMIN — Medication 4: at 17:30

## 2023-09-16 RX ADMIN — Medication 166.67 MILLIGRAM(S): at 21:13

## 2023-09-16 RX ADMIN — Medication 166.67 MILLIGRAM(S): at 11:22

## 2023-09-16 RX ADMIN — Medication 6: at 12:21

## 2023-09-16 RX ADMIN — Medication 1 APPLICATION(S): at 11:21

## 2023-09-16 RX ADMIN — Medication 2: at 21:14

## 2023-09-16 RX ADMIN — Medication 4: at 08:36

## 2023-09-16 NOTE — CONSULT NOTE ADULT - SUBJECTIVE AND OBJECTIVE BOX
PCP:    REQUESTING PHYSICIAN:    REASON FOR CONSULT:    CHIEF COMPLAINT:    HPI:  Pt is a 64 yo male who denies any medical problems, who presents to ED due to worsening infection of left hand. Pt states he injured his hand about three months ago and did not see a doctor or go to hospital due to lack of insurance/funds. Pt states he was hurt and since has been treating at home with salves, home remedies but nothing helps. Pt endorses having chills and feeling ill but not sure if febrile. States there is a discharge from the hand that is at times malodorous. States he is also having pain but not since his hand being wrapped in ED.  (14 Sep 2023 04:49) Cardiology called for preop evaluation. Pt denies chest pain or shortness of breath. He denies exertional difficulty. He has not seen a cardiologist. Echo was reviewed, labs and ECG were reviewed.       PAST MEDICAL & SURGICAL HISTORY:  Dental cavities      Hand trauma      No significant past surgical history          Allergies    No Known Allergies    Intolerances        SOCIAL HISTORY:    FAMILY HISTORY:  No pertinent family history in first degree relatives        MEDICATIONS:  MEDICATIONS  (STANDING):  bacitracin   Ointment 1 Application(s) Topical two times a day  cefTRIAXone Injectable. 2000 milliGRAM(s) IV Push every 24 hours  dextrose 5%. 1000 milliLiter(s) (50 mL/Hr) IV Continuous <Continuous>  dextrose 5%. 1000 milliLiter(s) (100 mL/Hr) IV Continuous <Continuous>  dextrose 50% Injectable 12.5 Gram(s) IV Push once  dextrose 50% Injectable 25 Gram(s) IV Push once  dextrose 50% Injectable 25 Gram(s) IV Push once  enoxaparin Injectable 40 milliGRAM(s) SubCutaneous every 24 hours  glucagon  Injectable 1 milliGRAM(s) IntraMuscular once  influenza   Vaccine 0.5 milliLiter(s) IntraMuscular once  insulin glargine Injectable (LANTUS) 20 Unit(s) SubCutaneous at bedtime  insulin lispro (ADMELOG) corrective regimen sliding scale   SubCutaneous three times a day before meals  insulin lispro (ADMELOG) corrective regimen sliding scale   SubCutaneous at bedtime  lisinopril 5 milliGRAM(s) Oral daily  vancomycin  IVPB 1250 milliGRAM(s) IV Intermittent every 12 hours    MEDICATIONS  (PRN):  acetaminophen     Tablet .. 650 milliGRAM(s) Oral every 6 hours PRN Temp greater or equal to 38C (100.4F), Mild Pain (1 - 3)  dextrose Oral Gel 15 Gram(s) Oral once PRN Blood Glucose LESS THAN 70 milliGRAM(s)/deciliter  melatonin 3 milliGRAM(s) Oral at bedtime PRN Insomnia  ondansetron Injectable 4 milliGRAM(s) IV Push every 8 hours PRN Nausea and/or Vomiting  oxycodone    5 mG/acetaminophen 325 mG 1 Tablet(s) Oral every 4 hours PRN Severe Pain (7 - 10)      REVIEW OF SYSTEMS:    CONSTITUTIONAL: No weakness, fevers or chills  EYES/ENT: No visual changes;  No vertigo or throat pain   NECK: No pain or stiffness  RESPIRATORY: No cough, wheezing, hemoptysis; No shortness of breath  CARDIOVASCULAR: No chest pain or palpitations  GASTROINTESTINAL: No abdominal or epigastric pain. No nausea, vomiting, or hematemesis; No diarrhea or constipation. No melena or hematochezia.  GENITOURINARY: No dysuria, frequency or hematuria  NEUROLOGICAL: No numbness or weakness  SKIN: No itching, burning, rashes, or lesions     Vital Signs Last 24 Hrs  T(C): 36.8 (16 Sep 2023 08:13), Max: 37 (15 Sep 2023 20:54)  T(F): 98.2 (16 Sep 2023 08:13), Max: 98.6 (15 Sep 2023 20:54)  HR: 93 (16 Sep 2023 08:13) (93 - 95)  BP: 123/83 (16 Sep 2023 08:13) (110/80 - 123/83)  BP(mean): --  RR: 18 (16 Sep 2023 08:13) (18 - 18)  SpO2: 96% (16 Sep 2023 08:13) (96% - 96%)    Parameters below as of 16 Sep 2023 08:13  Patient On (Oxygen Delivery Method): room air        I&O's Summary      PHYSICAL EXAM:    Constitutional: NAD, awake and alert, well-developed  HEENT: PERR, EOMI,  No oral cyananosis.  Neck:  supple,  No JVD  Respiratory: Breath sounds are clear bilaterally, No wheezing, rales or rhonchi  Cardiovascular: S1 and S2, regular rate and rhythm, no Murmurs, gallops or rubs  Gastrointestinal: Bowel Sounds present, soft, nontender.   Extremities: Dressing on finger  Vascular: 2+ peripheral pulses  Neurological: A/O x 3, no focal deficits  Musculoskeletal: no calf tenderness.  Skin: No rashes.      LABS: All Labs Reviewed:                        13.4   8.04  )-----------( 417      ( 16 Sep 2023 08:01 )             39.0                         12.9   8.85  )-----------( 347      ( 15 Sep 2023 06:24 )             36.9                         11.2   6.93  )-----------( 296      ( 14 Sep 2023 07:31 )             31.7     16 Sep 2023 08:01    133    |  99     |  14     ----------------------------<  242    3.9     |  27     |  0.56   15 Sep 2023 06:24    136    |  104    |  11     ----------------------------<  235    4.0     |  26     |  0.53   14 Sep 2023 07:31    140    |  108    |  8      ----------------------------<  266    3.0     |  26     |  0.46     Ca    9.2        16 Sep 2023 08:01  Ca    9.3        15 Sep 2023 06:24  Ca    8.6        14 Sep 2023 07:31  Mg     1.9       14 Sep 2023 07:31    TPro  6.5    /  Alb  2.8    /  TBili  0.4    /  DBili  x      /  AST  26     /  ALT  43     /  AlkPhos  94     14 Sep 2023 07:31  TPro  8.1    /  Alb  3.5    /  TBili  0.3    /  DBili  x      /  AST  24     /  ALT  48     /  AlkPhos  160    13 Sep 2023 21:13          Blood Culture: Organism --  Gram Stain Blood -- Gram Stain --  Specimen Source .Blood Blood-Peripheral  Culture-Blood --            RADIOLOGY/EKG:< from: 12 Lead ECG (09.14.23 @ 07:12) >  Diagnosis Line Normal sinus rhythm  Normal ECG  No previous ECGs available  Confirmed by YOHANA GALLO PAUL ([id number]) on 9/14/2023 8:24:24 AM    < end of copied text >

## 2023-09-16 NOTE — CONSULT NOTE ADULT - REASON FOR ADMISSION
Osteomyelitis of left third distal phalanx with cellulitis of third digit and dorsum of hand
Osteomyelitis of left third distal phalanx with cellulitis of third digit and dorsum of hand

## 2023-09-16 NOTE — CONSULT NOTE ADULT - PROBLEM SELECTOR RECOMMENDATION 9
62 yo male denies medical history or symptoms. Hyperglycemia by labs. ECG is normal. Echo reveals no significant valvular abnormalities with normal EF.   There are no cardiac contraindications for planned finger amputation.

## 2023-09-16 NOTE — PROGRESS NOTE ADULT - SUBJECTIVE AND OBJECTIVE BOX
HPI: Pt is a 62 yo male who denies any medical problems, who presents to ED due to worsening infection of left hand. Pt states he injured his hand about three months ago and did not see a doctor or go to hospital due to lack of insurance/funds. Pt states he was hurt and since has been treating at home with salves, home remedies but nothing helps. Pt endorses having chills and feeling ill but not sure if febrile. States there is a discharge from the hand that is at times malodorous. States he is also having pain but not since his hand being wrapped in ED.     9/15: no new complaints  MRI left hand    9/16: no complaints    PHYSICAL EXAM:    Vital Signs Last 24 Hrs  T(C): 37 (15 Sep 2023 14:59), Max: 37.1 (14 Sep 2023 16:18)  T(F): 98.6 (15 Sep 2023 14:59), Max: 98.8 (14 Sep 2023 16:18)  HR: 95 (15 Sep 2023 14:59) (87 - 100)  BP: 119/72 (15 Sep 2023 14:59) (105/74 - 135/88)  BP(mean): --  RR: 18 (15 Sep 2023 14:59) (18 - 18)  SpO2: 94% (15 Sep 2023 14:59) (94% - 99%)    Constitutional: Weak  appearing  HEENT: Atraumatic, NOAH,   Respiratory: Breath Sounds normal, no rhonchi/wheeze  Cardiovascular: N S1S2;   Gastrointestinal: Abdomen soft, non tender, Bowel Sounds present  Extremities: left hand edema, 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; peripheral pulses present  Neurological: AAO x 3, no gross focal motor deficits  Skin: Non cellulitic, no rash, ulcers  Lymph Nodes: No lymphadenopathy noted  Back: No CVA tenderness   Musculoskeletal: non tender  Breasts: Deferred  Genitourinary: deferred  Rectal: Deferred    All Labs/EKG/Radiology/Meds reviewed by me                          12.9   8.85  )-----------( 347      ( 15 Sep 2023 06:24 )             36.9       CBC Full  -  ( 15 Sep 2023 06:24 )  WBC Count : 8.85 K/uL  RBC Count : 4.09 M/uL  Hemoglobin : 12.9 g/dL  Hematocrit : 36.9 %  Platelet Count - Automated : 347 K/uL  Mean Cell Volume : 90.2 fl  Mean Cell Hemoglobin : 31.5 pg  Mean Cell Hemoglobin Concentration : 35.0 gm/dL  Auto Neutrophil # : x  Auto Lymphocyte # : x  Auto Monocyte # : x  Auto Eosinophil # : x  Auto Basophil # : x  Auto Neutrophil % : x  Auto Lymphocyte % : x  Auto Monocyte % : x  Auto Eosinophil % : x  Auto Basophil % : x      09-15    136  |  104  |  11  ----------------------------<  235<H>  4.0   |  26  |  0.53    Ca    9.3      15 Sep 2023 06:24  Mg     1.9     09-14    TPro  6.5  /  Alb  2.8<L>  /  TBili  0.4  /  DBili  x   /  AST  26  /  ALT  43  /  AlkPhos  94  09-14      LIVER FUNCTIONS - ( 14 Sep 2023 07:31 )  Alb: 2.8 g/dL / Pro: 6.5 gm/dL / ALK PHOS: 94 U/L / ALT: 43 U/L / AST: 26 U/L / GGT: x             PT/INR - ( 14 Sep 2023 07:31 )   PT: 11.3 sec;   INR: 1.00 ratio         PTT - ( 14 Sep 2023 07:31 )  PTT:27.2 sec          Urinalysis Basic - ( 15 Sep 2023 06:24 )    Color: x / Appearance: x / SG: x / pH: x  Gluc: 235 mg/dL / Ketone: x  / Bili: x / Urobili: x   Blood: x / Protein: x / Nitrite: x   Leuk Esterase: x / RBC: x / WBC x   Sq Epi: x / Non Sq Epi: x / Bacteria: x      < from: MR Hand w/wo IV Cont, Left (09.15.23 @ 12:57) >  IMPRESSION:  Limited study with MRI artifact limiting evaluation of the exposed bone   related to extensively denuded soft tissue involving the third digit at   the level of the distal phalanx. Osteomyelitis is not appreciated, but is   also definitely not excluded on the basis of this exam.    Extensive soft tissue edema of the third digit, with moderate flexor   tendon tenosynovitis most notable at the level of the third metacarpal.    < end of copied text >        MEDICATIONS  (STANDING):  bacitracin   Ointment 1 Application(s) Topical two times a day  cefTRIAXone Injectable. 2000 milliGRAM(s) IV Push every 24 hours  dextrose 5%. 1000 milliLiter(s) (50 mL/Hr) IV Continuous <Continuous>  dextrose 5%. 1000 milliLiter(s) (100 mL/Hr) IV Continuous <Continuous>  dextrose 50% Injectable 12.5 Gram(s) IV Push once  dextrose 50% Injectable 25 Gram(s) IV Push once  dextrose 50% Injectable 25 Gram(s) IV Push once  enoxaparin Injectable 40 milliGRAM(s) SubCutaneous every 24 hours  glucagon  Injectable 1 milliGRAM(s) IntraMuscular once  influenza   Vaccine 0.5 milliLiter(s) IntraMuscular once  insulin glargine Injectable (LANTUS) 20 Unit(s) SubCutaneous at bedtime  insulin lispro (ADMELOG) corrective regimen sliding scale   SubCutaneous three times a day before meals  insulin lispro (ADMELOG) corrective regimen sliding scale   SubCutaneous at bedtime  lisinopril 5 milliGRAM(s) Oral daily  vancomycin  IVPB 1250 milliGRAM(s) IV Intermittent every 12 hours    MEDICATIONS  (PRN):  acetaminophen     Tablet .. 650 milliGRAM(s) Oral every 6 hours PRN Temp greater or equal to 38C (100.4F), Mild Pain (1 - 3)  dextrose Oral Gel 15 Gram(s) Oral once PRN Blood Glucose LESS THAN 70 milliGRAM(s)/deciliter  melatonin 3 milliGRAM(s) Oral at bedtime PRN Insomnia  ondansetron Injectable 4 milliGRAM(s) IV Push every 8 hours PRN Nausea and/or Vomiting  oxycodone    5 mG/acetaminophen 325 mG 1 Tablet(s) Oral every 4 hours PRN Severe Pain (7 - 10)

## 2023-09-16 NOTE — PROGRESS NOTE ADULT - ASSESSMENT
Pt is a 62 yo male with no known pmhx, admitted due to:    # ?Osteomyelitis and cellulitis of left hand + Necrosis of left 3rd finger tip + Flexor Tenosynovitis of 3rd digit:   admit to medicine  ID consult  Ortho hand consult appreciated, recs implemented  ESR elevated  c/w Vanco/Zosyn; vanco dose increased  f/u blood cultures  Tylenol prn pain, fever  Percocet prn severe pain  Ambulate as tolerated  MRI left hand done with and without contrast; noted as above  pt to go for Sx 9/17  Pt would need risk stratification for surgery  no known Hx of CAD but has uncontrolled DM2 with finger gangrene  would have to obtain cardiology consult to r/o CAD    #New onset Diabetes mellitus: uncontrolled  AISS  accuchecks qAC/HS  carb restriction  diet modification discussed  will need nutritional counseling   A1c : more than 15.5  Titrate insulin accordingly    #Chin laceration   Bacitracin bid    Social work consult- pt with poor health due to lack of funds/housing/employment    DVT PPX: lovenox    DISPO: cardio consult; echo Pt is a 64 yo male with no known pmhx, admitted due to:    # ?Osteomyelitis and cellulitis of left hand + Necrosis of left 3rd finger tip + Flexor Tenosynovitis of 3rd digit:   admit to medicine  ID consult  Ortho hand consult appreciated, recs implemented  ESR elevated  c/w Vanco/Zosyn; vanco dose increased  f/u blood cultures  Tylenol prn pain, fever  Percocet prn severe pain  Ambulate as tolerated  MRI left hand done with and without contrast; noted as above  pt to go for Sx 9/17  Pt would need risk stratification for surgery  no known Hx of CAD but has uncontrolled DM2 with finger gangrene  would have to obtain cardiology consult to r/o CAD; pt cleared for surgery    #New onset Diabetes mellitus: uncontrolled  AISS  accuchecks qAC/HS  carb restriction  diet modification discussed  will need nutritional counseling   A1c : more than 15.5  Titrate insulin accordingly    #Chin laceration   Bacitracin bid    Social work consult- pt with poor health due to lack of funds/housing/employment    DVT PPX: lovenox    DISPO: pt medically optimized and cleared by cardio and medical team for finger amputation and associated surgery.

## 2023-09-16 NOTE — PROGRESS NOTE ADULT - SUBJECTIVE AND OBJECTIVE BOX
Patient seen and examined at bedside. Patient states that his pain is unchanged from yesterday. Denies any fever/chills, or numbness/tingling in the non necrotic digit     LABS:                        12.9   8.85  )-----------( 347      ( 15 Sep 2023 06:24 )             36.9     09-15    136  |  104  |  11  ----------------------------<  235<H>  4.0   |  26  |  0.53    Ca    9.3      15 Sep 2023 06:24        Urinalysis Basic - ( 15 Sep 2023 06:24 )    Color: x / Appearance: x / SG: x / pH: x  Gluc: 235 mg/dL / Ketone: x  / Bili: x / Urobili: x   Blood: x / Protein: x / Nitrite: x   Leuk Esterase: x / RBC: x / WBC x   Sq Epi: x / Non Sq Epi: x / Bacteria: x        VITAL SIGNS:  T(C): 36.8 (09-16-23 @ 08:13), Max: 37 (09-15-23 @ 14:59)  HR: 93 (09-16-23 @ 08:13) (93 - 95)  BP: 123/83 (09-16-23 @ 08:13) (110/80 - 123/83)  RR: 18 (09-16-23 @ 08:13) (18 - 18)  SpO2: 96% (09-16-23 @ 08:13) (94% - 96%)aspect of the affected extremity.    PHYSICAL EXAM  General: NAD     LUE:   Dressings c/d/i  Mild tenderness to palpation over the 3rd digit  Minimal tenderness over the dorsum of the hand  Minimal fluctuance extending to PIP with minimal drainage  motor grossly intact throughout axillary/musculocutaenous/radial/median/ulnar nerves  Sensory: SILT +AIN/PIN/med/uln/rad  + radial pulse      IMAGING:  XR : please refer to official report  MR Hand: please refer to official report    Assessment/Plan:  63y Male with necrotic left third distal phalanx, cellulitis 3rd digit and dorsum of hand    -Digit will auto amputate, no urgent surgical intervention at present; can consider revision amputation on an outpatient basis; Patient at high risk for surgical complications given suspected uncontrolled DM  -IV abx for 3rd digit and hand cellulitis, per ID; suspected purulent drainage vs granulation tissue from 3rd finger  -Do not suspect flexor teno as digit is draining, no flexor posturing, no pain w/ extension; +swelling diffusely (not fusiform); +tenderness along tendon sheath, however diffusely mildly tender  -Pain control as needed  -DVT ppx per primary   -NWB L Hand  -Rec IV abx per primary vs ID  -Glycemic control; A1c >15, GLucose >300s  -BCx: NGTD   -ESR/CRP: 86/96    Discussed with Dr. Garcia who is aware and agrees with plan.   Discussed with Dr. Garcia who is aware and agrees with above plan,.

## 2023-09-17 ENCOUNTER — TRANSCRIPTION ENCOUNTER (OUTPATIENT)
Age: 63
End: 2023-09-17

## 2023-09-17 LAB
ABO RH CONFIRMATION: SIGNIFICANT CHANGE UP
ANION GAP SERPL CALC-SCNC: 5 MMOL/L — SIGNIFICANT CHANGE UP (ref 5–17)
ANION GAP SERPL CALC-SCNC: 5 MMOL/L — SIGNIFICANT CHANGE UP (ref 5–17)
APTT BLD: 35.6 SEC — SIGNIFICANT CHANGE UP (ref 24.5–35.6)
BUN SERPL-MCNC: 13 MG/DL — SIGNIFICANT CHANGE UP (ref 7–23)
BUN SERPL-MCNC: 16 MG/DL — SIGNIFICANT CHANGE UP (ref 7–23)
CALCIUM SERPL-MCNC: 8.8 MG/DL — SIGNIFICANT CHANGE UP (ref 8.5–10.1)
CALCIUM SERPL-MCNC: 9.1 MG/DL — SIGNIFICANT CHANGE UP (ref 8.5–10.1)
CHLORIDE SERPL-SCNC: 99 MMOL/L — SIGNIFICANT CHANGE UP (ref 96–108)
CHLORIDE SERPL-SCNC: 99 MMOL/L — SIGNIFICANT CHANGE UP (ref 96–108)
CO2 SERPL-SCNC: 26 MMOL/L — SIGNIFICANT CHANGE UP (ref 22–31)
CO2 SERPL-SCNC: 27 MMOL/L — SIGNIFICANT CHANGE UP (ref 22–31)
CREAT SERPL-MCNC: 0.55 MG/DL — SIGNIFICANT CHANGE UP (ref 0.5–1.3)
CREAT SERPL-MCNC: 0.62 MG/DL — SIGNIFICANT CHANGE UP (ref 0.5–1.3)
EGFR: 108 ML/MIN/1.73M2 — SIGNIFICANT CHANGE UP
EGFR: 112 ML/MIN/1.73M2 — SIGNIFICANT CHANGE UP
GLUCOSE BLDC GLUCOMTR-MCNC: 140 MG/DL — HIGH (ref 70–99)
GLUCOSE BLDC GLUCOMTR-MCNC: 184 MG/DL — HIGH (ref 70–99)
GLUCOSE BLDC GLUCOMTR-MCNC: 230 MG/DL — HIGH (ref 70–99)
GLUCOSE BLDC GLUCOMTR-MCNC: 275 MG/DL — HIGH (ref 70–99)
GLUCOSE SERPL-MCNC: 215 MG/DL — HIGH (ref 70–99)
GLUCOSE SERPL-MCNC: 260 MG/DL — HIGH (ref 70–99)
HCT VFR BLD CALC: 35.8 % — LOW (ref 39–50)
HCT VFR BLD CALC: 36.7 % — LOW (ref 39–50)
HGB BLD-MCNC: 12.8 G/DL — LOW (ref 13–17)
HGB BLD-MCNC: 13.5 G/DL — SIGNIFICANT CHANGE UP (ref 13–17)
INR BLD: 0.95 RATIO — SIGNIFICANT CHANGE UP (ref 0.85–1.18)
MCHC RBC-ENTMCNC: 31.8 PG — SIGNIFICANT CHANGE UP (ref 27–34)
MCHC RBC-ENTMCNC: 32.7 PG — SIGNIFICANT CHANGE UP (ref 27–34)
MCHC RBC-ENTMCNC: 35.8 GM/DL — SIGNIFICANT CHANGE UP (ref 32–36)
MCHC RBC-ENTMCNC: 36.8 GM/DL — HIGH (ref 32–36)
MCV RBC AUTO: 88.8 FL — SIGNIFICANT CHANGE UP (ref 80–100)
MCV RBC AUTO: 88.9 FL — SIGNIFICANT CHANGE UP (ref 80–100)
PLATELET # BLD AUTO: 402 K/UL — HIGH (ref 150–400)
PLATELET # BLD AUTO: 455 K/UL — HIGH (ref 150–400)
POTASSIUM SERPL-MCNC: 3.4 MMOL/L — LOW (ref 3.5–5.3)
POTASSIUM SERPL-MCNC: 3.6 MMOL/L — SIGNIFICANT CHANGE UP (ref 3.5–5.3)
POTASSIUM SERPL-SCNC: 3.4 MMOL/L — LOW (ref 3.5–5.3)
POTASSIUM SERPL-SCNC: 3.6 MMOL/L — SIGNIFICANT CHANGE UP (ref 3.5–5.3)
PROTHROM AB SERPL-ACNC: 10.8 SEC — SIGNIFICANT CHANGE UP (ref 9.5–13)
RBC # BLD: 4.03 M/UL — LOW (ref 4.2–5.8)
RBC # BLD: 4.13 M/UL — LOW (ref 4.2–5.8)
RBC # FLD: 11.4 % — SIGNIFICANT CHANGE UP (ref 10.3–14.5)
RBC # FLD: 11.5 % — SIGNIFICANT CHANGE UP (ref 10.3–14.5)
SODIUM SERPL-SCNC: 130 MMOL/L — LOW (ref 135–145)
SODIUM SERPL-SCNC: 131 MMOL/L — LOW (ref 135–145)
VANCOMYCIN TROUGH SERPL-MCNC: 7.1 UG/ML — LOW (ref 10–20)
WBC # BLD: 7.03 K/UL — SIGNIFICANT CHANGE UP (ref 3.8–10.5)
WBC # BLD: 7.17 K/UL — SIGNIFICANT CHANGE UP (ref 3.8–10.5)
WBC # FLD AUTO: 7.03 K/UL — SIGNIFICANT CHANGE UP (ref 3.8–10.5)
WBC # FLD AUTO: 7.17 K/UL — SIGNIFICANT CHANGE UP (ref 3.8–10.5)

## 2023-09-17 PROCEDURE — 88305 TISSUE EXAM BY PATHOLOGIST: CPT | Mod: 26

## 2023-09-17 PROCEDURE — 99232 SBSQ HOSP IP/OBS MODERATE 35: CPT

## 2023-09-17 RX ORDER — SENNA PLUS 8.6 MG/1
2 TABLET ORAL AT BEDTIME
Refills: 0 | Status: DISCONTINUED | OUTPATIENT
Start: 2023-09-17 | End: 2023-09-23

## 2023-09-17 RX ORDER — SODIUM CHLORIDE 9 MG/ML
1000 INJECTION INTRAMUSCULAR; INTRAVENOUS; SUBCUTANEOUS
Refills: 0 | Status: DISCONTINUED | OUTPATIENT
Start: 2023-09-17 | End: 2023-09-23

## 2023-09-17 RX ORDER — HYDROMORPHONE HYDROCHLORIDE 2 MG/ML
0.5 INJECTION INTRAMUSCULAR; INTRAVENOUS; SUBCUTANEOUS
Refills: 0 | Status: DISCONTINUED | OUTPATIENT
Start: 2023-09-17 | End: 2023-09-17

## 2023-09-17 RX ORDER — ONDANSETRON 8 MG/1
4 TABLET, FILM COATED ORAL ONCE
Refills: 0 | Status: DISCONTINUED | OUTPATIENT
Start: 2023-09-17 | End: 2023-09-17

## 2023-09-17 RX ORDER — OXYCODONE HYDROCHLORIDE 5 MG/1
10 TABLET ORAL ONCE
Refills: 0 | Status: DISCONTINUED | OUTPATIENT
Start: 2023-09-17 | End: 2023-09-17

## 2023-09-17 RX ORDER — POTASSIUM CHLORIDE 20 MEQ
40 PACKET (EA) ORAL ONCE
Refills: 0 | Status: COMPLETED | OUTPATIENT
Start: 2023-09-17 | End: 2023-09-17

## 2023-09-17 RX ORDER — ACETAMINOPHEN 500 MG
975 TABLET ORAL EVERY 8 HOURS
Refills: 0 | Status: DISCONTINUED | OUTPATIENT
Start: 2023-09-17 | End: 2023-09-23

## 2023-09-17 RX ORDER — POLYETHYLENE GLYCOL 3350 17 G/17G
17 POWDER, FOR SOLUTION ORAL DAILY
Refills: 0 | Status: DISCONTINUED | OUTPATIENT
Start: 2023-09-17 | End: 2023-09-23

## 2023-09-17 RX ORDER — SODIUM CHLORIDE 9 MG/ML
1000 INJECTION, SOLUTION INTRAVENOUS
Refills: 0 | Status: DISCONTINUED | OUTPATIENT
Start: 2023-09-17 | End: 2023-09-17

## 2023-09-17 RX ORDER — OXYCODONE HYDROCHLORIDE 5 MG/1
2.5 TABLET ORAL EVERY 4 HOURS
Refills: 0 | Status: DISCONTINUED | OUTPATIENT
Start: 2023-09-17 | End: 2023-09-23

## 2023-09-17 RX ADMIN — Medication 975 MILLIGRAM(S): at 15:50

## 2023-09-17 RX ADMIN — Medication 2: at 11:55

## 2023-09-17 RX ADMIN — CEFTRIAXONE 2000 MILLIGRAM(S): 500 INJECTION, POWDER, FOR SOLUTION INTRAMUSCULAR; INTRAVENOUS at 10:45

## 2023-09-17 RX ADMIN — Medication 4: at 06:09

## 2023-09-17 RX ADMIN — Medication 1 APPLICATION(S): at 21:05

## 2023-09-17 RX ADMIN — Medication 166.67 MILLIGRAM(S): at 09:24

## 2023-09-17 RX ADMIN — INSULIN GLARGINE 20 UNIT(S): 100 INJECTION, SOLUTION SUBCUTANEOUS at 21:05

## 2023-09-17 RX ADMIN — Medication 3: at 21:08

## 2023-09-17 RX ADMIN — Medication 3 MILLIGRAM(S): at 21:05

## 2023-09-17 RX ADMIN — SENNA PLUS 2 TABLET(S): 8.6 TABLET ORAL at 21:07

## 2023-09-17 RX ADMIN — SODIUM CHLORIDE 75 MILLILITER(S): 9 INJECTION INTRAMUSCULAR; INTRAVENOUS; SUBCUTANEOUS at 06:49

## 2023-09-17 RX ADMIN — Medication 975 MILLIGRAM(S): at 14:55

## 2023-09-17 RX ADMIN — Medication 40 MILLIEQUIVALENT(S): at 06:07

## 2023-09-17 RX ADMIN — LISINOPRIL 5 MILLIGRAM(S): 2.5 TABLET ORAL at 09:25

## 2023-09-17 RX ADMIN — Medication 975 MILLIGRAM(S): at 21:06

## 2023-09-17 RX ADMIN — Medication 166.67 MILLIGRAM(S): at 21:06

## 2023-09-17 RX ADMIN — OXYCODONE HYDROCHLORIDE 2.5 MILLIGRAM(S): 5 TABLET ORAL at 19:23

## 2023-09-17 RX ADMIN — POLYETHYLENE GLYCOL 3350 17 GRAM(S): 17 POWDER, FOR SOLUTION ORAL at 16:55

## 2023-09-17 NOTE — PROGRESS NOTE ADULT - SUBJECTIVE AND OBJECTIVE BOX
Patient seen and examined at bedside. Patient reports pain well controlled on medications. No acute events overnight. Pt denies fevers, chills, new onset numbness, weakness or tingling in the extremities.    T(C): 36.4 (09-17-23 @ 08:30), Max: 37.2 (09-16-23 @ 15:47)  T(F): 97.5 (09-17-23 @ 08:30), Max: 99 (09-16-23 @ 15:47)  HR: 96 (09-17-23 @ 08:30) (94 - 98)  BP: 120/78 (09-17-23 @ 08:30) (113/82 - 120/78)  RR: 18 (09-17-23 @ 08:30) (17 - 18)  SpO2: 97% (09-17-23 @ 08:30) (95% - 97%)    LUE:   Dressings c/d/i  Mild tenderness to palpation over the 3rd digit  Minimal tenderness over the dorsum of the hand  Minimal fluctuance extending to PIP with minimal drainage  motor grossly intact throughout axillary/musculocutaenous/radial/median/ulnar nerves  Sensory: SILT +AIN/PIN/med/uln/rad  + radial pulse    Assessment/Plan:  63y Male with necrotic left third distal phalanx, cellulitis 3rd digit and dorsum of hand    -OR today for amputation of finger  -NPO except meds  -Medical/cards clearance appreciated  -IV abx for 3rd digit and hand cellulitis, per ID; suspected purulent drainage vs granulation tissue from 3rd finger  -Do not suspect flexor teno as digit is draining, no flexor posturing, no pain w/ extension; +swelling diffusely (not fusiform); +tenderness along tendon sheath, however diffusely mildly tender  -Pain control as needed  -DVT ppx per primary   -NWB L Hand  -Rec IV abx per primary vs ID  -Glycemic control; A1c >15, GLucose >300s  -BCx: NGTD   -ESR/CRP: 86/96    Discussed with Dr. Garcia who is aware and agrees with plan.

## 2023-09-17 NOTE — PROGRESS NOTE ADULT - SUBJECTIVE AND OBJECTIVE BOX
HPI: Pt is a 62 yo male who denies any medical problems, who presents to ED due to worsening infection of left hand. Pt states he injured his hand about three months ago and did not see a doctor or go to hospital due to lack of insurance/funds. Pt states he was hurt and since has been treating at home with salves, home remedies but nothing helps. Pt endorses having chills and feeling ill but not sure if febrile. States there is a discharge from the hand that is at times malodorous. States he is also having pain but not since his hand being wrapped in ED.     9/15: no new complaints  MRI left hand    9/16: no complaints    9/17: no complaints  for sx today    PHYSICAL EXAM:    Vital Signs Last 24 Hrs  T(C): 36.3 (17 Sep 2023 12:15), Max: 37.2 (16 Sep 2023 15:47)  T(F): 97.3 (17 Sep 2023 12:15), Max: 99 (16 Sep 2023 15:47)  HR: 79 (17 Sep 2023 12:15) (76 - 98)  BP: 101/65 (17 Sep 2023 12:15) (98/75 - 120/78)  BP(mean): 92 (17 Sep 2023 08:30) (92 - 92)  RR: 15 (17 Sep 2023 12:15) (12 - 18)  SpO2: 99% (17 Sep 2023 12:15) (95% - 100%)    Parameters below as of 17 Sep 2023 12:15  Patient On (Oxygen Delivery Method): nasal cannula  O2 Flow (L/min): 2    Constitutional: Weak  appearing  HEENT: Atraumatic, NOAH,   Respiratory: Breath Sounds normal, no rhonchi/wheeze  Cardiovascular: N S1S2;   Gastrointestinal: Abdomen soft, non tender, Bowel Sounds present  Extremities: left hand edema, 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; peripheral pulses present  Neurological: AAO x 3, no gross focal motor deficits  Skin: Non cellulitic, no rash, ulcers  Lymph Nodes: No lymphadenopathy noted  Back: No CVA tenderness   Musculoskeletal: non tender  Breasts: Deferred  Genitourinary: deferred  Rectal: Deferred    All Labs/EKG/Radiology/Meds reviewed    Lab Results:  CBC  CBC Full  -  ( 17 Sep 2023 07:04 )  WBC Count : 7.03 K/uL  RBC Count : 4.13 M/uL  Hemoglobin : 13.5 g/dL  Hematocrit : 36.7 %  Platelet Count - Automated : 455 K/uL  Mean Cell Volume : 88.9 fl  Mean Cell Hemoglobin : 32.7 pg  Mean Cell Hemoglobin Concentration : 36.8 gm/dL  Auto Neutrophil # : x  Auto Lymphocyte # : x  Auto Monocyte # : x  Auto Eosinophil # : x  Auto Basophil # : x  Auto Neutrophil % : x  Auto Lymphocyte % : x  Auto Monocyte % : x  Auto Eosinophil % : x  Auto Basophil % : x    .		Differential:	[] Automated		[] Manual  Chemistry  09-17    131<L>  |  99  |  13  ----------------------------<  215<H>  3.6   |  27  |  0.55    Ca    9.1      17 Sep 2023 07:04        PT/INR - ( 17 Sep 2023 00:51 )   PT: 10.8 sec;   INR: 0.95 ratio         PTT - ( 17 Sep 2023 00:51 )  PTT:35.6 sec  Urinalysis Basic - ( 17 Sep 2023 07:04 )    Color: x / Appearance: x / SG: x / pH: x  Gluc: 215 mg/dL / Ketone: x  / Bili: x / Urobili: x   Blood: x / Protein: x / Nitrite: x   Leuk Esterase: x / RBC: x / WBC x   Sq Epi: x / Non Sq Epi: x / Bacteria: x        09-17-23 @ 07:01  -  09-17-23 @ 13:06  --------------------------------------------------------  IN: 500 mL / OUT: 0 mL / NET: 500 mL      MICROBIOLOGY/CULTURES:  Culture Results:   No growth at 72 Hours (09-13 @ 21:13)  Culture Results:   No growth at 72 Hours (09-13 @ 21:13)                                  12.9   8.85  )-----------( 347      ( 15 Sep 2023 06:24 )             36.9       CBC Full  -  ( 15 Sep 2023 06:24 )  WBC Count : 8.85 K/uL  RBC Count : 4.09 M/uL  Hemoglobin : 12.9 g/dL  Hematocrit : 36.9 %  Platelet Count - Automated : 347 K/uL  Mean Cell Volume : 90.2 fl  Mean Cell Hemoglobin : 31.5 pg  Mean Cell Hemoglobin Concentration : 35.0 gm/dL  Auto Neutrophil # : x  Auto Lymphocyte # : x  Auto Monocyte # : x  Auto Eosinophil # : x  Auto Basophil # : x  Auto Neutrophil % : x  Auto Lymphocyte % : x  Auto Monocyte % : x  Auto Eosinophil % : x  Auto Basophil % : x      09-15    136  |  104  |  11  ----------------------------<  235<H>  4.0   |  26  |  0.53    Ca    9.3      15 Sep 2023 06:24  Mg     1.9     09-14    TPro  6.5  /  Alb  2.8<L>  /  TBili  0.4  /  DBili  x   /  AST  26  /  ALT  43  /  AlkPhos  94  09-14      LIVER FUNCTIONS - ( 14 Sep 2023 07:31 )  Alb: 2.8 g/dL / Pro: 6.5 gm/dL / ALK PHOS: 94 U/L / ALT: 43 U/L / AST: 26 U/L / GGT: x             PT/INR - ( 14 Sep 2023 07:31 )   PT: 11.3 sec;   INR: 1.00 ratio         PTT - ( 14 Sep 2023 07:31 )  PTT:27.2 sec          Urinalysis Basic - ( 15 Sep 2023 06:24 )    Color: x / Appearance: x / SG: x / pH: x  Gluc: 235 mg/dL / Ketone: x  / Bili: x / Urobili: x   Blood: x / Protein: x / Nitrite: x   Leuk Esterase: x / RBC: x / WBC x   Sq Epi: x / Non Sq Epi: x / Bacteria: x      < from: MR Hand w/wo IV Cont, Left (09.15.23 @ 12:57) >  IMPRESSION:  Limited study with MRI artifact limiting evaluation of the exposed bone   related to extensively denuded soft tissue involving the third digit at   the level of the distal phalanx. Osteomyelitis is not appreciated, but is   also definitely not excluded on the basis of this exam.    Extensive soft tissue edema of the third digit, with moderate flexor   tendon tenosynovitis most notable at the level of the third metacarpal.    < end of copied text >        MEDICATIONS  (STANDING):  bacitracin   Ointment 1 Application(s) Topical two times a day  cefTRIAXone Injectable. 2000 milliGRAM(s) IV Push every 24 hours  dextrose 5%. 1000 milliLiter(s) (50 mL/Hr) IV Continuous <Continuous>  dextrose 5%. 1000 milliLiter(s) (100 mL/Hr) IV Continuous <Continuous>  dextrose 50% Injectable 12.5 Gram(s) IV Push once  dextrose 50% Injectable 25 Gram(s) IV Push once  dextrose 50% Injectable 25 Gram(s) IV Push once  enoxaparin Injectable 40 milliGRAM(s) SubCutaneous every 24 hours  glucagon  Injectable 1 milliGRAM(s) IntraMuscular once  influenza   Vaccine 0.5 milliLiter(s) IntraMuscular once  insulin glargine Injectable (LANTUS) 20 Unit(s) SubCutaneous at bedtime  insulin lispro (ADMELOG) corrective regimen sliding scale   SubCutaneous three times a day before meals  insulin lispro (ADMELOG) corrective regimen sliding scale   SubCutaneous at bedtime  lisinopril 5 milliGRAM(s) Oral daily  vancomycin  IVPB 1250 milliGRAM(s) IV Intermittent every 12 hours    MEDICATIONS  (PRN):  acetaminophen     Tablet .. 650 milliGRAM(s) Oral every 6 hours PRN Temp greater or equal to 38C (100.4F), Mild Pain (1 - 3)  dextrose Oral Gel 15 Gram(s) Oral once PRN Blood Glucose LESS THAN 70 milliGRAM(s)/deciliter  melatonin 3 milliGRAM(s) Oral at bedtime PRN Insomnia  ondansetron Injectable 4 milliGRAM(s) IV Push every 8 hours PRN Nausea and/or Vomiting  oxycodone    5 mG/acetaminophen 325 mG 1 Tablet(s) Oral every 4 hours PRN Severe Pain (7 - 10)

## 2023-09-18 LAB
ANION GAP SERPL CALC-SCNC: 7 MMOL/L — SIGNIFICANT CHANGE UP (ref 5–17)
BUN SERPL-MCNC: 12 MG/DL — SIGNIFICANT CHANGE UP (ref 7–23)
CALCIUM SERPL-MCNC: 9.3 MG/DL — SIGNIFICANT CHANGE UP (ref 8.5–10.1)
CHLORIDE SERPL-SCNC: 101 MMOL/L — SIGNIFICANT CHANGE UP (ref 96–108)
CO2 SERPL-SCNC: 26 MMOL/L — SIGNIFICANT CHANGE UP (ref 22–31)
CREAT SERPL-MCNC: 0.5 MG/DL — SIGNIFICANT CHANGE UP (ref 0.5–1.3)
EGFR: 115 ML/MIN/1.73M2 — SIGNIFICANT CHANGE UP
GLUCOSE BLDC GLUCOMTR-MCNC: 174 MG/DL — HIGH (ref 70–99)
GLUCOSE BLDC GLUCOMTR-MCNC: 240 MG/DL — HIGH (ref 70–99)
GLUCOSE BLDC GLUCOMTR-MCNC: 266 MG/DL — HIGH (ref 70–99)
GLUCOSE BLDC GLUCOMTR-MCNC: 349 MG/DL — HIGH (ref 70–99)
GLUCOSE SERPL-MCNC: 209 MG/DL — HIGH (ref 70–99)
HCT VFR BLD CALC: 37.1 % — LOW (ref 39–50)
HGB BLD-MCNC: 12.7 G/DL — LOW (ref 13–17)
MCHC RBC-ENTMCNC: 30.8 PG — SIGNIFICANT CHANGE UP (ref 27–34)
MCHC RBC-ENTMCNC: 34.2 GM/DL — SIGNIFICANT CHANGE UP (ref 32–36)
MCV RBC AUTO: 89.8 FL — SIGNIFICANT CHANGE UP (ref 80–100)
NIGHT BLUE STAIN TISS: SIGNIFICANT CHANGE UP
PLATELET # BLD AUTO: 448 K/UL — HIGH (ref 150–400)
POTASSIUM SERPL-MCNC: 3.7 MMOL/L — SIGNIFICANT CHANGE UP (ref 3.5–5.3)
POTASSIUM SERPL-SCNC: 3.7 MMOL/L — SIGNIFICANT CHANGE UP (ref 3.5–5.3)
RBC # BLD: 4.13 M/UL — LOW (ref 4.2–5.8)
RBC # FLD: 11.5 % — SIGNIFICANT CHANGE UP (ref 10.3–14.5)
SODIUM SERPL-SCNC: 134 MMOL/L — LOW (ref 135–145)
SPECIMEN SOURCE: SIGNIFICANT CHANGE UP
VANCOMYCIN TROUGH SERPL-MCNC: 7.1 UG/ML — LOW (ref 10–20)
WBC # BLD: 6.53 K/UL — SIGNIFICANT CHANGE UP (ref 3.8–10.5)
WBC # FLD AUTO: 6.53 K/UL — SIGNIFICANT CHANGE UP (ref 3.8–10.5)

## 2023-09-18 PROCEDURE — 99232 SBSQ HOSP IP/OBS MODERATE 35: CPT

## 2023-09-18 RX ADMIN — Medication 975 MILLIGRAM(S): at 13:35

## 2023-09-18 RX ADMIN — Medication 2: at 07:54

## 2023-09-18 RX ADMIN — Medication 166.67 MILLIGRAM(S): at 09:00

## 2023-09-18 RX ADMIN — SENNA PLUS 2 TABLET(S): 8.6 TABLET ORAL at 21:29

## 2023-09-18 RX ADMIN — Medication 166.67 MILLIGRAM(S): at 22:49

## 2023-09-18 RX ADMIN — Medication 1 APPLICATION(S): at 21:38

## 2023-09-18 RX ADMIN — LISINOPRIL 5 MILLIGRAM(S): 2.5 TABLET ORAL at 09:00

## 2023-09-18 RX ADMIN — Medication 2: at 21:30

## 2023-09-18 RX ADMIN — Medication 975 MILLIGRAM(S): at 05:24

## 2023-09-18 RX ADMIN — Medication 975 MILLIGRAM(S): at 22:00

## 2023-09-18 RX ADMIN — INSULIN GLARGINE 20 UNIT(S): 100 INJECTION, SOLUTION SUBCUTANEOUS at 21:30

## 2023-09-18 RX ADMIN — Medication 8: at 11:48

## 2023-09-18 RX ADMIN — Medication 1 APPLICATION(S): at 09:00

## 2023-09-18 RX ADMIN — Medication 975 MILLIGRAM(S): at 13:41

## 2023-09-18 RX ADMIN — POLYETHYLENE GLYCOL 3350 17 GRAM(S): 17 POWDER, FOR SOLUTION ORAL at 09:00

## 2023-09-18 RX ADMIN — Medication 6: at 17:24

## 2023-09-18 RX ADMIN — CEFTRIAXONE 2000 MILLIGRAM(S): 500 INJECTION, POWDER, FOR SOLUTION INTRAMUSCULAR; INTRAVENOUS at 09:27

## 2023-09-18 RX ADMIN — Medication 975 MILLIGRAM(S): at 21:29

## 2023-09-18 NOTE — PROGRESS NOTE ADULT - SUBJECTIVE AND OBJECTIVE BOX
Patient seen and examined at bedside. Pain well controlled with medication. Patient denies any numbness, tingling, weakness, or any other orthopaedic complaint.       PE:  Vital Signs Last 24 Hrs  T(C): 36.4 (09-17-23 @ 23:05), Max: 36.8 (09-17-23 @ 13:19)  T(F): 97.5 (09-17-23 @ 23:05), Max: 98.2 (09-17-23 @ 13:19)  HR: 86 (09-17-23 @ 23:05) (76 - 96)  BP: 108/72 (09-17-23 @ 23:05) (90/68 - 120/78)  BP(mean): 76 (09-17-23 @ 13:19) (76 - 92)  RR: 17 (09-17-23 @ 23:05) (12 - 18)  SpO2: 96% (09-17-23 @ 23:05) (92% - 100%)    General: NAD, resting comfortably in bed  LUE:   Dressing C/D/I  +AIN/PIN/U/R/MSK/Ax  SILT C5-T1  cap refill <2sec  Compartments soft and compressible  No calf tenderness bilaterally        PT/INR - ( 17 Sep 2023 00:51 )   PT: 10.8 sec;   INR: 0.95 ratio         PTT - ( 17 Sep 2023 00:51 )  PTT:35.6 sec    A/P:  63y m s/p L Middle Finger Amp at level of PIP POD 1    -NWB LUE  -Dressing to remain in place until Tuesday 9/19. At that time, betadine packing may be advanced and 50/50 peroxide soaks begun.  -FU OR Wound Cx  -PT/OT   -Pain Control  -DVT ppx per primary  -IV abx per ID/primary  -FU AM Labs  -Rest, ice, compress and elevate the extremity as needed  -Incentive Spirometry  -Medical management appreciated

## 2023-09-18 NOTE — PROGRESS NOTE ADULT - SUBJECTIVE AND OBJECTIVE BOX
HPI: Pt is a 62 yo male who denies any medical problems, who presents to ED due to worsening infection of left hand. Pt states he injured his hand about three months ago and did not see a doctor or go to hospital due to lack of insurance/funds. Pt states he was hurt and since has been treating at home with salves, home remedies but nothing helps. Pt endorses having chills and feeling ill but not sure if febrile. States there is a discharge from the hand that is at times malodorous. States he is also having pain but not since his hand being wrapped in ED.     9/15: no new complaints  MRI left hand    9/16: no complaints    9/17: no complaints  for sx today    9/18: no complaints  s/p left middle finger  amputation 9/17    PHYSICAL EXAM:    Vital Signs Last 24 Hrs  T(C): 36.3 (18 Sep 2023 08:50), Max: 36.4 (17 Sep 2023 15:10)  T(F): 97.3 (18 Sep 2023 08:50), Max: 97.5 (17 Sep 2023 15:10)  HR: 84 (18 Sep 2023 08:50) (84 - 86)  BP: 101/74 (18 Sep 2023 08:50) (101/74 - 108/72)  BP(mean): --  RR: 17 (18 Sep 2023 08:50) (16 - 17)  SpO2: 97% (18 Sep 2023 08:50) (92% - 97%)    Parameters below as of 18 Sep 2023 08:50  Patient On (Oxygen Delivery Method): room air        Constitutional: Weak  appearing  HEENT: Atraumatic, NOAH,   Respiratory: Breath Sounds normal, no rhonchi/wheeze  Cardiovascular: N S1S2;   Gastrointestinal: Abdomen soft, non tender, Bowel Sounds present  Extremities: left hand edema, 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; peripheral pulses present  Neurological: AAO x 3, no gross focal motor deficits  Skin: Non cellulitic, no rash, ulcers  Lymph Nodes: No lymphadenopathy noted  Back: No CVA tenderness   Musculoskeletal: non tender  Breasts: Deferred  Genitourinary: deferred  Rectal: Deferred    All Labs/EKG/Radiology/Meds reviewed    Lab Results:  CBC  CBC Full  -  ( 18 Sep 2023 07:04 )  WBC Count : 6.53 K/uL  RBC Count : 4.13 M/uL  Hemoglobin : 12.7 g/dL  Hematocrit : 37.1 %  Platelet Count - Automated : 448 K/uL  Mean Cell Volume : 89.8 fl  Mean Cell Hemoglobin : 30.8 pg  Mean Cell Hemoglobin Concentration : 34.2 gm/dL  Auto Neutrophil # : x  Auto Lymphocyte # : x  Auto Monocyte # : x  Auto Eosinophil # : x  Auto Basophil # : x  Auto Neutrophil % : x  Auto Lymphocyte % : x  Auto Monocyte % : x  Auto Eosinophil % : x  Auto Basophil % : x    .		Differential:	[] Automated		[] Manual  Chemistry  09-18    134<L>  |  101  |  12  ----------------------------<  209<H>  3.7   |  26  |  0.50    Ca    9.3      18 Sep 2023 07:04        PT/INR - ( 17 Sep 2023 00:51 )   PT: 10.8 sec;   INR: 0.95 ratio         PTT - ( 17 Sep 2023 00:51 )  PTT:35.6 sec  Urinalysis Basic - ( 18 Sep 2023 07:04 )    Color: x / Appearance: x / SG: x / pH: x  Gluc: 209 mg/dL / Ketone: x  / Bili: x / Urobili: x   Blood: x / Protein: x / Nitrite: x   Leuk Esterase: x / RBC: x / WBC x   Sq Epi: x / Non Sq Epi: x / Bacteria: x        09-17-23 @ 07:01  -  09-18-23 @ 07:00  --------------------------------------------------------  IN: 920 mL / OUT: 300 mL / NET: 620 mL      MICROBIOLOGY/CULTURES:  Culture Results:   Testing in progress (09-17 @ 10:45)  Culture Results:   No growth at 4 days (09-13 @ 21:13)  Culture Results:   No growth at 4 days (09-13 @ 21:13)            Lab Results:  CBC  CBC Full  -  ( 17 Sep 2023 07:04 )  WBC Count : 7.03 K/uL  RBC Count : 4.13 M/uL  Hemoglobin : 13.5 g/dL  Hematocrit : 36.7 %  Platelet Count - Automated : 455 K/uL  Mean Cell Volume : 88.9 fl  Mean Cell Hemoglobin : 32.7 pg  Mean Cell Hemoglobin Concentration : 36.8 gm/dL  Auto Neutrophil # : x  Auto Lymphocyte # : x  Auto Monocyte # : x  Auto Eosinophil # : x  Auto Basophil # : x  Auto Neutrophil % : x  Auto Lymphocyte % : x  Auto Monocyte % : x  Auto Eosinophil % : x  Auto Basophil % : x    .		Differential:	[] Automated		[] Manual  Chemistry  09-17    131<L>  |  99  |  13  ----------------------------<  215<H>  3.6   |  27  |  0.55    Ca    9.1      17 Sep 2023 07:04        PT/INR - ( 17 Sep 2023 00:51 )   PT: 10.8 sec;   INR: 0.95 ratio         PTT - ( 17 Sep 2023 00:51 )  PTT:35.6 sec  Urinalysis Basic - ( 17 Sep 2023 07:04 )    Color: x / Appearance: x / SG: x / pH: x  Gluc: 215 mg/dL / Ketone: x  / Bili: x / Urobili: x   Blood: x / Protein: x / Nitrite: x   Leuk Esterase: x / RBC: x / WBC x   Sq Epi: x / Non Sq Epi: x / Bacteria: x        09-17-23 @ 07:01  -  09-17-23 @ 13:06  --------------------------------------------------------  IN: 500 mL / OUT: 0 mL / NET: 500 mL      MICROBIOLOGY/CULTURES:  Culture Results:   No growth at 72 Hours (09-13 @ 21:13)  Culture Results:   No growth at 72 Hours (09-13 @ 21:13)                                  12.9   8.85  )-----------( 347      ( 15 Sep 2023 06:24 )             36.9       CBC Full  -  ( 15 Sep 2023 06:24 )  WBC Count : 8.85 K/uL  RBC Count : 4.09 M/uL  Hemoglobin : 12.9 g/dL  Hematocrit : 36.9 %  Platelet Count - Automated : 347 K/uL  Mean Cell Volume : 90.2 fl  Mean Cell Hemoglobin : 31.5 pg  Mean Cell Hemoglobin Concentration : 35.0 gm/dL  Auto Neutrophil # : x  Auto Lymphocyte # : x  Auto Monocyte # : x  Auto Eosinophil # : x  Auto Basophil # : x  Auto Neutrophil % : x  Auto Lymphocyte % : x  Auto Monocyte % : x  Auto Eosinophil % : x  Auto Basophil % : x      09-15    136  |  104  |  11  ----------------------------<  235<H>  4.0   |  26  |  0.53    Ca    9.3      15 Sep 2023 06:24  Mg     1.9     09-14    TPro  6.5  /  Alb  2.8<L>  /  TBili  0.4  /  DBili  x   /  AST  26  /  ALT  43  /  AlkPhos  94  09-14      LIVER FUNCTIONS - ( 14 Sep 2023 07:31 )  Alb: 2.8 g/dL / Pro: 6.5 gm/dL / ALK PHOS: 94 U/L / ALT: 43 U/L / AST: 26 U/L / GGT: x             PT/INR - ( 14 Sep 2023 07:31 )   PT: 11.3 sec;   INR: 1.00 ratio         PTT - ( 14 Sep 2023 07:31 )  PTT:27.2 sec          Urinalysis Basic - ( 15 Sep 2023 06:24 )    Color: x / Appearance: x / SG: x / pH: x  Gluc: 235 mg/dL / Ketone: x  / Bili: x / Urobili: x   Blood: x / Protein: x / Nitrite: x   Leuk Esterase: x / RBC: x / WBC x   Sq Epi: x / Non Sq Epi: x / Bacteria: x      < from: MR Hand w/wo IV Cont, Left (09.15.23 @ 12:57) >  IMPRESSION:  Limited study with MRI artifact limiting evaluation of the exposed bone   related to extensively denuded soft tissue involving the third digit at   the level of the distal phalanx. Osteomyelitis is not appreciated, but is   also definitely not excluded on the basis of this exam.    Extensive soft tissue edema of the third digit, with moderate flexor   tendon tenosynovitis most notable at the level of the third metacarpal.    < end of copied text >        MEDICATIONS  (STANDING):  bacitracin   Ointment 1 Application(s) Topical two times a day  cefTRIAXone Injectable. 2000 milliGRAM(s) IV Push every 24 hours  dextrose 5%. 1000 milliLiter(s) (50 mL/Hr) IV Continuous <Continuous>  dextrose 5%. 1000 milliLiter(s) (100 mL/Hr) IV Continuous <Continuous>  dextrose 50% Injectable 12.5 Gram(s) IV Push once  dextrose 50% Injectable 25 Gram(s) IV Push once  dextrose 50% Injectable 25 Gram(s) IV Push once  enoxaparin Injectable 40 milliGRAM(s) SubCutaneous every 24 hours  glucagon  Injectable 1 milliGRAM(s) IntraMuscular once  influenza   Vaccine 0.5 milliLiter(s) IntraMuscular once  insulin glargine Injectable (LANTUS) 20 Unit(s) SubCutaneous at bedtime  insulin lispro (ADMELOG) corrective regimen sliding scale   SubCutaneous three times a day before meals  insulin lispro (ADMELOG) corrective regimen sliding scale   SubCutaneous at bedtime  lisinopril 5 milliGRAM(s) Oral daily  vancomycin  IVPB 1250 milliGRAM(s) IV Intermittent every 12 hours    MEDICATIONS  (PRN):  acetaminophen     Tablet .. 650 milliGRAM(s) Oral every 6 hours PRN Temp greater or equal to 38C (100.4F), Mild Pain (1 - 3)  dextrose Oral Gel 15 Gram(s) Oral once PRN Blood Glucose LESS THAN 70 milliGRAM(s)/deciliter  melatonin 3 milliGRAM(s) Oral at bedtime PRN Insomnia  ondansetron Injectable 4 milliGRAM(s) IV Push every 8 hours PRN Nausea and/or Vomiting  oxycodone    5 mG/acetaminophen 325 mG 1 Tablet(s) Oral every 4 hours PRN Severe Pain (7 - 10)

## 2023-09-18 NOTE — PROGRESS NOTE ADULT - ASSESSMENT
Pt is a 64 yo male with no known pmhx, admitted due to:    # ?Osteomyelitis and cellulitis of left hand + Necrosis of left 3rd finger tip + Flexor Tenosynovitis of 3rd digit:   admit to medicine  ID consult  Ortho hand consult appreciated, recs implemented  ESR elevated  c/w Vanco/Zosyn; vanco dose increased  f/u blood cultures  Tylenol prn pain, fever  Percocet prn severe pain  Ambulate as tolerated  MRI left hand done with and without contrast; noted as above  pt to go for Sx 9/17  Pt would need risk stratification for surgery  no known Hx of CAD but has uncontrolled DM2 with finger gangrene  would have to obtain cardiology consult to r/o CAD; pt cleared for surgery  s/p left middle finger  amputation 9/17  f/u wound cx    #New onset Diabetes mellitus: uncontrolled  AISS  accuchecks qAC/HS  carb restriction  diet modification discussed  will need nutritional counseling   A1c : more than 15.5  Titrate insulin accordingly    #Chin laceration   Bacitracin bid    Social work consult- pt with poor health due to lack of funds/housing/employment    DVT PPX: lovenox    DISPO: s/p left middle finger  amputation 9/17; f/u wound cx.

## 2023-09-18 NOTE — PROGRESS NOTE ADULT - SUBJECTIVE AND OBJECTIVE BOX
Postop Check    Patient tolerated the procedure well. Patient seen and examined at bedside. No acute complaints at this time. Pain well controlled. Denies chest pain, shortness of breath, nausea or vomiting.     PE:  Vital Signs Last 24 Hrs  T(C): 36.4 (09-17-23 @ 23:05), Max: 36.8 (09-17-23 @ 13:19)  T(F): 97.5 (09-17-23 @ 23:05), Max: 98.2 (09-17-23 @ 13:19)  HR: 86 (09-17-23 @ 23:05) (76 - 96)  BP: 108/72 (09-17-23 @ 23:05) (90/68 - 120/78)  BP(mean): 76 (09-17-23 @ 13:19) (76 - 92)  RR: 17 (09-17-23 @ 23:05) (12 - 18)  SpO2: 96% (09-17-23 @ 23:05) (92% - 100%)    General: NAD, resting comfortably in bed  LUE:   Dressing C/D/I  +AIN/PIN/U/R/MSK/Ax  SILT C5-T1  cap refill <2sec  Compartments soft and compressible  No calf tenderness bilaterally        PT/INR - ( 17 Sep 2023 00:51 )   PT: 10.8 sec;   INR: 0.95 ratio         PTT - ( 17 Sep 2023 00:51 )  PTT:35.6 sec    A/P:  63y m s/p L Middle Finger Amp at level of PIP POD 0    -NWB LUE  -Dressing to remain in place until Tuesday 9/19. At that time, betadine packing may be advanced and 50/50 peroxide soaks begun.  -FU OR Wound Cx  -PT/OT   -Pain Control  -DVT ppx per primary  -IV abx per ID/primary  -FU AM Labs  -Rest, ice, compress and elevate the extremity as needed  -Incentive Spirometry  -Medical management appreciated

## 2023-09-19 LAB
ANION GAP SERPL CALC-SCNC: 1 MMOL/L — LOW (ref 5–17)
BUN SERPL-MCNC: 15 MG/DL — SIGNIFICANT CHANGE UP (ref 7–23)
CALCIUM SERPL-MCNC: 9.4 MG/DL — SIGNIFICANT CHANGE UP (ref 8.5–10.1)
CHLORIDE SERPL-SCNC: 101 MMOL/L — SIGNIFICANT CHANGE UP (ref 96–108)
CO2 SERPL-SCNC: 31 MMOL/L — SIGNIFICANT CHANGE UP (ref 22–31)
CREAT SERPL-MCNC: 0.51 MG/DL — SIGNIFICANT CHANGE UP (ref 0.5–1.3)
CULTURE RESULTS: SIGNIFICANT CHANGE UP
CULTURE RESULTS: SIGNIFICANT CHANGE UP
EGFR: 115 ML/MIN/1.73M2 — SIGNIFICANT CHANGE UP
GLUCOSE BLDC GLUCOMTR-MCNC: 190 MG/DL — HIGH (ref 70–99)
GLUCOSE BLDC GLUCOMTR-MCNC: 254 MG/DL — HIGH (ref 70–99)
GLUCOSE BLDC GLUCOMTR-MCNC: 277 MG/DL — HIGH (ref 70–99)
GLUCOSE BLDC GLUCOMTR-MCNC: 337 MG/DL — HIGH (ref 70–99)
GLUCOSE SERPL-MCNC: 229 MG/DL — HIGH (ref 70–99)
HCT VFR BLD CALC: 35.6 % — LOW (ref 39–50)
HGB BLD-MCNC: 12.2 G/DL — LOW (ref 13–17)
MCHC RBC-ENTMCNC: 30.8 PG — SIGNIFICANT CHANGE UP (ref 27–34)
MCHC RBC-ENTMCNC: 34.3 GM/DL — SIGNIFICANT CHANGE UP (ref 32–36)
MCV RBC AUTO: 89.9 FL — SIGNIFICANT CHANGE UP (ref 80–100)
PLATELET # BLD AUTO: 473 K/UL — HIGH (ref 150–400)
POTASSIUM SERPL-MCNC: 4 MMOL/L — SIGNIFICANT CHANGE UP (ref 3.5–5.3)
POTASSIUM SERPL-SCNC: 4 MMOL/L — SIGNIFICANT CHANGE UP (ref 3.5–5.3)
RBC # BLD: 3.96 M/UL — LOW (ref 4.2–5.8)
RBC # FLD: 11.5 % — SIGNIFICANT CHANGE UP (ref 10.3–14.5)
SODIUM SERPL-SCNC: 133 MMOL/L — LOW (ref 135–145)
SPECIMEN SOURCE: SIGNIFICANT CHANGE UP
SPECIMEN SOURCE: SIGNIFICANT CHANGE UP
WBC # BLD: 5.84 K/UL — SIGNIFICANT CHANGE UP (ref 3.8–10.5)
WBC # FLD AUTO: 5.84 K/UL — SIGNIFICANT CHANGE UP (ref 3.8–10.5)

## 2023-09-19 PROCEDURE — 99232 SBSQ HOSP IP/OBS MODERATE 35: CPT

## 2023-09-19 RX ORDER — HYDROGEN PEROXIDE 0.3 KG/100L
1 LIQUID TOPICAL
Refills: 0 | Status: DISCONTINUED | OUTPATIENT
Start: 2023-09-19 | End: 2023-09-23

## 2023-09-19 RX ORDER — ENOXAPARIN SODIUM 100 MG/ML
40 INJECTION SUBCUTANEOUS EVERY 24 HOURS
Refills: 0 | Status: DISCONTINUED | OUTPATIENT
Start: 2023-09-19 | End: 2023-09-23

## 2023-09-19 RX ADMIN — Medication 1 APPLICATION(S): at 10:14

## 2023-09-19 RX ADMIN — Medication 975 MILLIGRAM(S): at 13:37

## 2023-09-19 RX ADMIN — Medication 975 MILLIGRAM(S): at 05:08

## 2023-09-19 RX ADMIN — Medication 166.67 MILLIGRAM(S): at 21:08

## 2023-09-19 RX ADMIN — Medication 975 MILLIGRAM(S): at 21:41

## 2023-09-19 RX ADMIN — Medication 1 APPLICATION(S): at 21:09

## 2023-09-19 RX ADMIN — Medication 6: at 13:37

## 2023-09-19 RX ADMIN — Medication 6: at 17:48

## 2023-09-19 RX ADMIN — Medication 4: at 21:08

## 2023-09-19 RX ADMIN — HYDROGEN PEROXIDE 1 APPLICATION(S): 0.3 LIQUID TOPICAL at 21:21

## 2023-09-19 RX ADMIN — OXYCODONE HYDROCHLORIDE 2.5 MILLIGRAM(S): 5 TABLET ORAL at 13:30

## 2023-09-19 RX ADMIN — Medication 975 MILLIGRAM(S): at 13:59

## 2023-09-19 RX ADMIN — INSULIN GLARGINE 20 UNIT(S): 100 INJECTION, SOLUTION SUBCUTANEOUS at 21:08

## 2023-09-19 RX ADMIN — Medication 975 MILLIGRAM(S): at 22:10

## 2023-09-19 RX ADMIN — Medication 975 MILLIGRAM(S): at 05:46

## 2023-09-19 RX ADMIN — Medication 2: at 09:54

## 2023-09-19 RX ADMIN — OXYCODONE HYDROCHLORIDE 2.5 MILLIGRAM(S): 5 TABLET ORAL at 12:38

## 2023-09-19 RX ADMIN — CEFTRIAXONE 2000 MILLIGRAM(S): 500 INJECTION, POWDER, FOR SOLUTION INTRAMUSCULAR; INTRAVENOUS at 09:52

## 2023-09-19 RX ADMIN — LISINOPRIL 5 MILLIGRAM(S): 2.5 TABLET ORAL at 09:52

## 2023-09-19 RX ADMIN — Medication 166.67 MILLIGRAM(S): at 09:54

## 2023-09-19 RX ADMIN — POLYETHYLENE GLYCOL 3350 17 GRAM(S): 17 POWDER, FOR SOLUTION ORAL at 09:51

## 2023-09-19 RX ADMIN — SENNA PLUS 2 TABLET(S): 8.6 TABLET ORAL at 21:07

## 2023-09-19 NOTE — PROGRESS NOTE ADULT - SUBJECTIVE AND OBJECTIVE BOX
Patient seen and examined at bedside. Pain well controlled with medication. Patient denies any numbness, tingling, weakness, or any other orthopaedic complaint.     LABS:                        12.2   5.84  )-----------( 473      ( 19 Sep 2023 06:14 )             35.6     09-19    133<L>  |  101  |  15  ----------------------------<  229<H>  4.0   |  31  |  0.51    Ca    9.4      19 Sep 2023 06:14        Urinalysis Basic - ( 19 Sep 2023 06:14 )    Color: x / Appearance: x / SG: x / pH: x  Gluc: 229 mg/dL / Ketone: x  / Bili: x / Urobili: x   Blood: x / Protein: x / Nitrite: x   Leuk Esterase: x / RBC: x / WBC x   Sq Epi: x / Non Sq Epi: x / Bacteria: x        VITAL SIGNS:  T(C): 36.8 (09-19-23 @ 09:02), Max: 36.8 (09-19-23 @ 09:02)  HR: 82 (09-19-23 @ 09:02) (82 - 105)  BP: 100/76 (09-19-23 @ 09:02) (100/68 - 100/76)  RR: 17 (09-19-23 @ 09:02) (17 - 18)  SpO2: 97% (09-19-23 @ 09:02) (97% - 99%)    General: NAD, resting comfortably in bed  LUE:   Dressing C/D/I  +AIN/PIN/U/R/MSK/Ax  SILT C5-T1  cap refill <2sec  Compartments soft and compressible  No calf tenderness bilaterally    A/P:  63y m s/p L Middle Finger Amp at level of PIP POD 2    -PT/OT   -NWB LUE  -Begin Advancing betadine packing with 50/50 peroxide soaks.  -OR Wound Cx: Postivie for Group A Strep  -Pain Control  -DVT ppx per primary  -IV abx per ID/primary  -FU AM Labs  -Rest, ice, compress and elevate the extremity as needed  -Incentive Spirometry  -Medical management appreciated

## 2023-09-19 NOTE — PROGRESS NOTE ADULT - ASSESSMENT
62 yo male with no known pmhx, admitted due to:    # ?Osteomyelitis and cellulitis of left hand + Necrosis of left 3rd finger tip + Flexor Tenosynovitis of 3rd digit:   admit to medicine  ID consult  Ortho hand consult appreciated, recs implemented  ESR elevated  c/w Vanco/Zosyn; vanco dose increased  f/u blood cultures  Tylenol prn pain, fever  Percocet prn severe pain  Ambulate as tolerated  MRI left hand done with and without contrast; noted as above  pt to go for Sx 9/17  Pt would need risk stratification for surgery  no known Hx of CAD but has uncontrolled DM2 with finger gangrene  would have to obtain cardiology consult to r/o CAD; pt cleared for surgery  s/p left middle finger  amputation 9/17  f/u wound cx    #New onset Diabetes mellitus: uncontrolled  AISS  accuchecks qAC/HS  carb restriction  diet modification discussed  will need nutritional counseling   A1c : more than 15.5  Titrate insulin accordingly    #Chin laceration   Bacitracin bid    Social work consult- pt with poor health due to lack of funds/housing/employment    DVT PPX: lovenox    DISPO: s/p left middle finger  amputation 9/17; f/u wound cx.

## 2023-09-19 NOTE — PROGRESS NOTE ADULT - SUBJECTIVE AND OBJECTIVE BOX
Pt is a 62 yo male who denies any medical problems, who presents to ED due to worsening infection of left hand. Pt states he injured his hand about three months ago and did not see a doctor or go to hospital due to lack of insurance/funds. Pt states he was hurt and since has been treating at home with salves, home remedies but nothing helps. Pt endorses having chills and feeling ill but not sure if febrile. States there is a discharge from the hand that is at times malodorous. States he is also having pain but not since his hand being wrapped in ED.     9/15: no new complaints  MRI left hand    9/16: no complaints    9/17: no complaints  for sx today    9/18: no complaints  s/p left middle finger  amputation 9/17    Vital Signs Last 24 Hrs  T(C): 36.5 (18 Sep 2023 23:02), Max: 36.7 (18 Sep 2023 16:00)  T(F): 97.7 (18 Sep 2023 23:02), Max: 98.1 (18 Sep 2023 16:00)  HR: 90 (18 Sep 2023 23:02) (84 - 105)  BP: 100/72 (18 Sep 2023 23:02) (100/68 - 101/74)  BP(mean): --  RR: 18 (18 Sep 2023 23:02) (17 - 18)  SpO2: 99% (18 Sep 2023 23:02) (97% - 99%)    Parameters below as of 18 Sep 2023 23:02  Patient On (Oxygen Delivery Method): room air                          12.2   5.84  )-----------( 473      ( 19 Sep 2023 06:14 )             35.6     09-19    133<L>  |  101  |  15  ----------------------------<  229<H>  4.0   |  31  |  0.51    Ca    9.4      19 Sep 2023 06:14     Pt is a 62 yo male who denies any medical problems, who presents to ED due to worsening infection of left hand. Pt states he injured his hand about three months ago and did not see a doctor or go to hospital due to lack of insurance/funds. Pt states he was hurt and since has been treating at home with salves, home remedies but nothing helps. Pt endorses having chills and feeling ill but not sure if febrile. States there is a discharge from the hand that is at times malodorous. States he is also having pain but not since his hand being wrapped in ED.     9/15: no new complaints  MRI left hand    9/16: no complaints    9/17: no complaints  for sx today    9/18: no complaints  s/p left middle finger  amputation 9/17    Vital Signs Last 24 Hrs  T(C): 36.5 (18 Sep 2023 23:02), Max: 36.7 (18 Sep 2023 16:00)  T(F): 97.7 (18 Sep 2023 23:02), Max: 98.1 (18 Sep 2023 16:00)  HR: 90 (18 Sep 2023 23:02) (84 - 105)  BP: 100/72 (18 Sep 2023 23:02) (100/68 - 101/74)  BP(mean): --  RR: 18 (18 Sep 2023 23:02) (17 - 18)  SpO2: 99% (18 Sep 2023 23:02) (97% - 99%)    Parameters below as of 18 Sep 2023 23:02  Patient On (Oxygen Delivery Method): room air                          12.2   5.84  )-----------( 473      ( 19 Sep 2023 06:14 )             35.6     09-19    133<L>  |  101  |  15  ----------------------------<  229<H>  4.0   |  31  |  0.51    Ca    9.4      19 Sep 2023 06:14    Acid Fast Bacilli Smear:   No acid-fast bacilli seen by fluorochrome stain (09.17.23 @ 10:45)     Surgical swab Culture Results:   Moderate Streptococcus agalactiae (Group B)   Group B streptococci are susceptible to ampicillin, penicillin and   cefazolin, but may be resistant to erythromycin and clindamycin. (09.17.23 @ 10:45)     Fungal Culture Results:   Testing in progress (09.17.23 @ 10:45)     Blood Culture Results:   No growth at 5 days (09.13.23 @ 21:13)  Pt is a 64 yo male who denies any medical problems, who presents to ED due to worsening infection of left hand. Pt states he injured his hand about three months ago and did not see a doctor or go to hospital due to lack of insurance/funds. Pt states he was hurt and since has been treating at home with salves, home remedies but nothing helps. Pt endorses having chills and feeling ill but not sure if febrile. States there is a discharge from the hand that is at times malodorous. States he is also having pain but not since his hand being wrapped in ED.     9/15: no new complaints  MRI left hand    9/16: no complaints    9/17: no complaints  for sx today    9/18: no complaints  s/p left middle finger  amputation 9/17 9/19  Patient seen and examined at bedside. Patient states pain is well controlled. No cp, no sob, no n/v.     Vital Signs Last 24 Hrs  T(C): 36.5 (18 Sep 2023 23:02), Max: 36.7 (18 Sep 2023 16:00)  T(F): 97.7 (18 Sep 2023 23:02), Max: 98.1 (18 Sep 2023 16:00)  HR: 90 (18 Sep 2023 23:02) (84 - 105)  BP: 100/72 (18 Sep 2023 23:02) (100/68 - 101/74)  BP(mean): --  RR: 18 (18 Sep 2023 23:02) (17 - 18)  SpO2: 99% (18 Sep 2023 23:02) (97% - 99%)    Parameters below as of 18 Sep 2023 23:02  Patient On (Oxygen Delivery Method): room air    Constitutional: Weak  appearing  HEENT: Atraumatic,  Respiratory: Breath Sounds normal, no rhonchi/wheeze  Cardiovascular: N S1S2;   Gastrointestinal: Abdomen soft, non tender, Bowel Sounds present  Extremities: left hand edema, 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; peripheral pulses present  Neurological: AAO x 3, no gross focal motor deficits  Skin: Non cellulitic, no rash, ulcers  Lymph Nodes: No lymphadenopathy noted  Back: No CVA tenderness   Musculoskeletal: non tender                   12.2   5.84  )-----------( 473      ( 19 Sep 2023 06:14 )             35.6     09-19    133<L>  |  101  |  15  ----------------------------<  229<H>  4.0   |  31  |  0.51    Ca    9.4      19 Sep 2023 06:14    Acid Fast Bacilli Smear:   No acid-fast bacilli seen by fluorochrome stain (09.17.23 @ 10:45)     Surgical swab Culture Results:   Moderate Streptococcus agalactiae (Group B)   Group B streptococci are susceptible to ampicillin, penicillin and   cefazolin, but may be resistant to erythromycin and clindamycin. (09.17.23 @ 10:45)     Fungal Culture Results:   Testing in progress (09.17.23 @ 10:45)     Blood Culture Results:   No growth at 5 days (09.13.23 @ 21:13)

## 2023-09-20 ENCOUNTER — TRANSCRIPTION ENCOUNTER (OUTPATIENT)
Age: 63
End: 2023-09-20

## 2023-09-20 LAB
-  AMIKACIN: SIGNIFICANT CHANGE UP
-  AMOXICILLIN/CLAVULANIC ACID: SIGNIFICANT CHANGE UP
-  AMPICILLIN/SULBACTAM: SIGNIFICANT CHANGE UP
-  AMPICILLIN/SULBACTAM: SIGNIFICANT CHANGE UP
-  AMPICILLIN: SIGNIFICANT CHANGE UP
-  AZTREONAM: SIGNIFICANT CHANGE UP
-  CEFAZOLIN: SIGNIFICANT CHANGE UP
-  CEFAZOLIN: SIGNIFICANT CHANGE UP
-  CEFEPIME: SIGNIFICANT CHANGE UP
-  CEFOXITIN: SIGNIFICANT CHANGE UP
-  CEFTRIAXONE: SIGNIFICANT CHANGE UP
-  CIPROFLOXACIN: SIGNIFICANT CHANGE UP
-  CLINDAMYCIN: SIGNIFICANT CHANGE UP
-  ERTAPENEM: SIGNIFICANT CHANGE UP
-  ERYTHROMYCIN: SIGNIFICANT CHANGE UP
-  GENTAMICIN: SIGNIFICANT CHANGE UP
-  GENTAMICIN: SIGNIFICANT CHANGE UP
-  IMIPENEM: SIGNIFICANT CHANGE UP
-  LEVOFLOXACIN: SIGNIFICANT CHANGE UP
-  MEROPENEM: SIGNIFICANT CHANGE UP
-  OXACILLIN: SIGNIFICANT CHANGE UP
-  PENICILLIN: SIGNIFICANT CHANGE UP
-  PIPERACILLIN/TAZOBACTAM: SIGNIFICANT CHANGE UP
-  RIFAMPIN: SIGNIFICANT CHANGE UP
-  TETRACYCLINE: SIGNIFICANT CHANGE UP
-  TOBRAMYCIN: SIGNIFICANT CHANGE UP
-  TRIMETHOPRIM/SULFAMETHOXAZOLE: SIGNIFICANT CHANGE UP
-  TRIMETHOPRIM/SULFAMETHOXAZOLE: SIGNIFICANT CHANGE UP
-  VANCOMYCIN: SIGNIFICANT CHANGE UP
ANION GAP SERPL CALC-SCNC: 4 MMOL/L — LOW (ref 5–17)
BUN SERPL-MCNC: 18 MG/DL — SIGNIFICANT CHANGE UP (ref 7–23)
CALCIUM SERPL-MCNC: 9 MG/DL — SIGNIFICANT CHANGE UP (ref 8.5–10.1)
CHLORIDE SERPL-SCNC: 101 MMOL/L — SIGNIFICANT CHANGE UP (ref 96–108)
CO2 SERPL-SCNC: 26 MMOL/L — SIGNIFICANT CHANGE UP (ref 22–31)
CREAT SERPL-MCNC: 0.57 MG/DL — SIGNIFICANT CHANGE UP (ref 0.5–1.3)
EGFR: 111 ML/MIN/1.73M2 — SIGNIFICANT CHANGE UP
GLUCOSE BLDC GLUCOMTR-MCNC: 126 MG/DL — HIGH (ref 70–99)
GLUCOSE BLDC GLUCOMTR-MCNC: 171 MG/DL — HIGH (ref 70–99)
GLUCOSE BLDC GLUCOMTR-MCNC: 204 MG/DL — HIGH (ref 70–99)
GLUCOSE BLDC GLUCOMTR-MCNC: 209 MG/DL — HIGH (ref 70–99)
GLUCOSE SERPL-MCNC: 184 MG/DL — HIGH (ref 70–99)
HCT VFR BLD CALC: 37.1 % — LOW (ref 39–50)
HGB BLD-MCNC: 12.8 G/DL — LOW (ref 13–17)
MCHC RBC-ENTMCNC: 31.2 PG — SIGNIFICANT CHANGE UP (ref 27–34)
MCHC RBC-ENTMCNC: 34.5 GM/DL — SIGNIFICANT CHANGE UP (ref 32–36)
MCV RBC AUTO: 90.5 FL — SIGNIFICANT CHANGE UP (ref 80–100)
METHOD TYPE: SIGNIFICANT CHANGE UP
METHOD TYPE: SIGNIFICANT CHANGE UP
PLATELET # BLD AUTO: 489 K/UL — HIGH (ref 150–400)
POTASSIUM SERPL-MCNC: 4.2 MMOL/L — SIGNIFICANT CHANGE UP (ref 3.5–5.3)
POTASSIUM SERPL-SCNC: 4.2 MMOL/L — SIGNIFICANT CHANGE UP (ref 3.5–5.3)
RBC # BLD: 4.1 M/UL — LOW (ref 4.2–5.8)
RBC # FLD: 11.4 % — SIGNIFICANT CHANGE UP (ref 10.3–14.5)
SODIUM SERPL-SCNC: 131 MMOL/L — LOW (ref 135–145)
VANCOMYCIN FLD-MCNC: 7.3 UG/ML — LOW (ref 10–20)
VANCOMYCIN TROUGH SERPL-MCNC: 34.9 UG/ML — CRITICAL HIGH (ref 10–20)
WBC # BLD: 5.03 K/UL — SIGNIFICANT CHANGE UP (ref 3.8–10.5)
WBC # FLD AUTO: 5.03 K/UL — SIGNIFICANT CHANGE UP (ref 3.8–10.5)

## 2023-09-20 PROCEDURE — 99233 SBSQ HOSP IP/OBS HIGH 50: CPT

## 2023-09-20 RX ADMIN — INSULIN GLARGINE 20 UNIT(S): 100 INJECTION, SOLUTION SUBCUTANEOUS at 22:47

## 2023-09-20 RX ADMIN — POLYETHYLENE GLYCOL 3350 17 GRAM(S): 17 POWDER, FOR SOLUTION ORAL at 09:12

## 2023-09-20 RX ADMIN — HYDROGEN PEROXIDE 1 APPLICATION(S): 0.3 LIQUID TOPICAL at 09:13

## 2023-09-20 RX ADMIN — SENNA PLUS 2 TABLET(S): 8.6 TABLET ORAL at 21:59

## 2023-09-20 RX ADMIN — Medication 2: at 22:15

## 2023-09-20 RX ADMIN — Medication 4: at 14:08

## 2023-09-20 RX ADMIN — Medication 166.67 MILLIGRAM(S): at 09:13

## 2023-09-20 RX ADMIN — Medication 975 MILLIGRAM(S): at 05:59

## 2023-09-20 RX ADMIN — ENOXAPARIN SODIUM 40 MILLIGRAM(S): 100 INJECTION SUBCUTANEOUS at 05:06

## 2023-09-20 RX ADMIN — Medication 975 MILLIGRAM(S): at 22:01

## 2023-09-20 RX ADMIN — Medication 975 MILLIGRAM(S): at 05:06

## 2023-09-20 RX ADMIN — Medication 1 APPLICATION(S): at 22:01

## 2023-09-20 RX ADMIN — CEFTRIAXONE 2000 MILLIGRAM(S): 500 INJECTION, POWDER, FOR SOLUTION INTRAMUSCULAR; INTRAVENOUS at 14:06

## 2023-09-20 RX ADMIN — LISINOPRIL 5 MILLIGRAM(S): 2.5 TABLET ORAL at 09:12

## 2023-09-20 RX ADMIN — Medication 2: at 08:27

## 2023-09-20 RX ADMIN — Medication 975 MILLIGRAM(S): at 14:08

## 2023-09-20 RX ADMIN — HYDROGEN PEROXIDE 1 APPLICATION(S): 0.3 LIQUID TOPICAL at 22:01

## 2023-09-20 RX ADMIN — Medication 1 APPLICATION(S): at 09:14

## 2023-09-20 NOTE — DISCHARGE NOTE PROVIDER - NSDCMRMEDTOKEN_GEN_ALL_CORE_FT
cefuroxime 500 mg oral tablet: 1 tab(s) orally 2 times a day  glipiZIDE 10 mg oral tablet: 1 tab(s) orally 2 times a day  lisinopril 5 mg oral tablet: 1 tab(s) orally once a day  metFORMIN 1000 mg oral tablet: 1 tab(s) orally 2 times a day  SITagliptin 50 mg oral tablet: 1 tab(s) orally once a day

## 2023-09-20 NOTE — DISCHARGE NOTE PROVIDER - NSDCCPCAREPLAN_GEN_ALL_CORE_FT
PRINCIPAL DISCHARGE DIAGNOSIS  Diagnosis: Cellulitis  Assessment and Plan of Treatment:       SECONDARY DISCHARGE DIAGNOSES  Diagnosis: Necrotic ulceration of fingers with necrosis of bone  Assessment and Plan of Treatment: Left 3rd digit     PRINCIPAL DISCHARGE DIAGNOSIS  Diagnosis: Cellulitis  Assessment and Plan of Treatment: Please continue to take antibiotics as prescribed.      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

## 2023-09-20 NOTE — DISCHARGE NOTE PROVIDER - NSDCFUADDINST_GEN_ALL_CORE_FT
Orthopaedic Surgery  1. Follow up outpatient with Dr. Garcia in 1-2 weeks after discharge  2. 50/50 Peroxide Soaks  3. Dry Dressing Changes

## 2023-09-20 NOTE — DISCHARGE NOTE PROVIDER - DETAILS OF MALNUTRITION DIAGNOSIS/DIAGNOSES
This patient has been assessed with a concern for Malnutrition and was treated during this hospitalization for the following Nutrition diagnosis/diagnoses:     -  09/15/2023: Moderate protein-calorie malnutrition

## 2023-09-20 NOTE — PROGRESS NOTE ADULT - ASSESSMENT
64 yo male who denies any medical problems, who presents to ED due to worsening infection of left hand. Pt states he injured his hand about three months ago and did not see a doctor or go to hospital due to lack of insurance/funds. Pt states he was hurt and since has been treating at home with salves, home remedies but nothing helps. Pt endorses having chills and feeling ill but not sure if febrile. States there is a discharge from the hand that is at times malodorous. States he is also having pain but not since his hand being wrapped in ED.     # Osteomyelitis and cellulitis of left hand + Necrosis of left 3rd finger tip + Flexor Tenosynovitis of 3rd digit:   - ESR elevated  - c/w Rocephin and Vanco  - Tylenol prn pain, fever  - Percocet prn severe pain  - s/p left middle finger  amputation 9/17  - f/u wound cx    #New onset Diabetes mellitus: uncontrolled  - Basal Bolus    # DVT PPX:   - lovenox

## 2023-09-20 NOTE — DISCHARGE NOTE PROVIDER - HOSPITAL COURSE
The patient is a 63y Male status who was admitted to [facility] for a Necrotic Left 3rd Distal Phalynx and Left 3rd Digit/Hand Cellulitis. Patient did not respond to initial antibiotic course and resorted to treatmetn by surgery. Patient was deemed medically cleared and Received an amputation tthe 3rd Digit Above the PIP. Prophylactic antibiotics were started before the procedure and continued for 24 hours. There were no complications during the procedure and patient tolerated the procedure well. The patient was transferred to the recovery room in stable condition and subsequently to the surgical floor. The patient was placed on Lovenox for anticoagulation while in house. All home medications were continued. The dressing was kept clean, dry, intact. The rest of the hospital stay was unremarkable. The patient is a 63y Male status who was admitted to [facility] for a Necrotic Left 3rd Distal Phalynx and Left 3rd Digit/Hand Cellulitis. Patient did not respond to initial antibiotic course and resorted to treatment by surgery. Patient was deemed medically cleared and Received an amputation tthe 3rd Digit Above the PIP. Prophylactic antibiotics were started before the procedure and continued for 24 hours. There were no complications during the procedure and patient tolerated the procedure well. The patient was transferred to the recovery room in stable condition and subsequently to the surgical floor. The patient was placed on Lovenox for anticoagulation while in house. All home medications were continued. The dressing was kept clean, dry, intact. The rest of the hospital stay was unremarkable.     Medicine Addendum  -Pt also noted to be newly diabetic. Started on Metformin, Glipizide and Sitagliptin. Started on ACEi. HbA1c>15.5. Patient will follow up at [facility] for further management

## 2023-09-20 NOTE — DISCHARGE NOTE PROVIDER - REASON FOR ADMISSION
Private Residence Osteomyelitis of left third distal phalanx with cellulitis of third digit and dorsum of hand

## 2023-09-20 NOTE — PROGRESS NOTE ADULT - SUBJECTIVE AND OBJECTIVE BOX
HOSPITALIST ATTENDING PROGRESS NOTE    Chart and meds reviewed.  Patient seen and examined.     CC: OM    Subjective: Feels well sp surgery. pain controlled. No fever.     All other systems reviewed and found to be negative with the exception of what has been described above.    MEDICATIONS  (STANDING):  acetaminophen     Tablet .. 975 milliGRAM(s) Oral every 8 hours  bacitracin   Ointment 1 Application(s) Topical two times a day  cefTRIAXone Injectable. 2000 milliGRAM(s) IV Push every 24 hours  dextrose 5%. 1000 milliLiter(s) (100 mL/Hr) IV Continuous <Continuous>  dextrose 5%. 1000 milliLiter(s) (50 mL/Hr) IV Continuous <Continuous>  dextrose 50% Injectable 12.5 Gram(s) IV Push once  dextrose 50% Injectable 25 Gram(s) IV Push once  dextrose 50% Injectable 25 Gram(s) IV Push once  enoxaparin Injectable 40 milliGRAM(s) SubCutaneous every 24 hours  glucagon  Injectable 1 milliGRAM(s) IntraMuscular once  hydrogen peroxide 3% Solution 1 Application(s) Topical two times a day  influenza   Vaccine 0.5 milliLiter(s) IntraMuscular once  insulin glargine Injectable (LANTUS) 20 Unit(s) SubCutaneous at bedtime  insulin lispro (ADMELOG) corrective regimen sliding scale   SubCutaneous three times a day before meals  insulin lispro (ADMELOG) corrective regimen sliding scale   SubCutaneous at bedtime  lisinopril 5 milliGRAM(s) Oral daily  polyethylene glycol 3350 17 Gram(s) Oral daily  senna 2 Tablet(s) Oral at bedtime  sodium chloride 0.9%. 1000 milliLiter(s) (75 mL/Hr) IV Continuous <Continuous>  vancomycin  IVPB 1250 milliGRAM(s) IV Intermittent every 12 hours    MEDICATIONS  (PRN):  acetaminophen     Tablet .. 650 milliGRAM(s) Oral every 6 hours PRN Temp greater or equal to 38C (100.4F), Mild Pain (1 - 3)  dextrose Oral Gel 15 Gram(s) Oral once PRN Blood Glucose LESS THAN 70 milliGRAM(s)/deciliter  melatonin 3 milliGRAM(s) Oral at bedtime PRN Insomnia  ondansetron Injectable 4 milliGRAM(s) IV Push every 8 hours PRN Nausea and/or Vomiting  oxyCODONE    IR 2.5 milliGRAM(s) Oral every 4 hours PRN Moderate Pain (4 - 6)      VITALS:  T(F): 97.9 (09-20-23 @ 08:28), Max: 98.2 (09-19-23 @ 23:35)  HR: 77 (09-20-23 @ 08:28) (77 - 89)  BP: 110/82 (09-20-23 @ 08:28) (105/72 - 110/82)  RR: 18 (09-20-23 @ 08:28) (18 - 18)  SpO2: 93% (09-20-23 @ 08:28) (93% - 98%)  Wt(kg): --    I&O's Summary      CAPILLARY BLOOD GLUCOSE      POCT Blood Glucose.: 171 mg/dL (20 Sep 2023 08:23)  POCT Blood Glucose.: 337 mg/dL (19 Sep 2023 20:59)  POCT Blood Glucose.: 254 mg/dL (19 Sep 2023 17:47)  POCT Blood Glucose.: 277 mg/dL (19 Sep 2023 13:34)      PHYSICAL EXAM:  GEN: NAD  HEENT:  pupils equal and reactive, EOMI, no oropharyngeal lesions, erythema, exudates, oral thrush  NECK:   supple, no carotid bruits, no palpable lymph nodes, no thyromegaly  CV:  +S1, +S2, regular, no murmurs or rubs  RESP:   lungs clear to auscultation bilaterally, no wheezing, rales, rhonchi, good air entry bilaterally  BREAST:  not examined  GI:  abdomen soft, non-tender, non-distended, normal BS, no bruits, no abdominal masses, no palpable masses  RECTAL:  not examined  :  not examined  MSK:   normal muscle tone, no atrophy, no rigidity, no contractions  EXT:  no clubbing, no cyanosis, no edema, no calf pain, swelling or erythema. Hand dressing CDI  VASCULAR:  pulses equal and symmetric in the upper and lower extremities  NEURO:  AAOX3, no focal neurological deficits, follows all commands, able to move extremities spontaneously  SKIN:  no ulcers, lesions or rashes    LABS:                            12.8   5.03  )-----------( 489      ( 20 Sep 2023 07:51 )             37.1     09-20    131<L>  |  101  |  18  ----------------------------<  184<H>  4.2   |  26  |  0.57    Ca    9.0      20 Sep 2023 07:51      Urinalysis Basic - ( 20 Sep 2023 07:51 )  Color: x / Appearance: x / SG: x / pH: x  Gluc: 184 mg/dL / Ketone: x  / Bili: x / Urobili: x   Blood: x / Protein: x / Nitrite: x   Leuk Esterase: x / RBC: x / WBC x   Sq Epi: x / Non Sq Epi: x / Bacteria: x

## 2023-09-20 NOTE — DISCHARGE NOTE PROVIDER - CARE PROVIDER_API CALL
Dimitrios Garcia.  Orthopaedic Surgery  [address] [place] [address]  Phone: [phone]  Fax: [phone]  Follow Up Time:

## 2023-09-20 NOTE — PROGRESS NOTE ADULT - SUBJECTIVE AND OBJECTIVE BOX
Patient seen and examined at bedside. Pain well controlled with medication. Patient denies any numbness, tingling, weakness, or any other orthopaedic complaint.     LABS:                        12.2   5.84  )-----------( 473      ( 19 Sep 2023 06:14 )             35.6     09-19    133<L>  |  101  |  15  ----------------------------<  229<H>  4.0   |  31  |  0.51    Ca    9.4      19 Sep 2023 06:14        Urinalysis Basic - ( 19 Sep 2023 06:14 )    Color: x / Appearance: x / SG: x / pH: x  Gluc: 229 mg/dL / Ketone: x  / Bili: x / Urobili: x   Blood: x / Protein: x / Nitrite: x   Leuk Esterase: x / RBC: x / WBC x   Sq Epi: x / Non Sq Epi: x / Bacteria: x        VITAL SIGNS:  T(C): 36.8 (09-19-23 @ 23:35), Max: 36.8 (09-19-23 @ 09:02)  HR: 87 (09-19-23 @ 23:35) (82 - 89)  BP: 105/72 (09-19-23 @ 23:35) (100/76 - 106/76)  RR: 18 (09-19-23 @ 23:35) (17 - 18)  SpO2: 96% (09-19-23 @ 23:35) (96% - 98%)    General: NAD, resting comfortably in bed  LUE:   Dressing C/D/I  +AIN/PIN/U/R/MSK/Ax  SILT C5-T1  cap refill <2sec  Compartments soft and compressible  No calf tenderness bilaterally    A/P:  63y m s/p L Middle Finger Amp at level of PIP POD3    -PT/OT   -NWB LUE  -Begin Advancing betadine packing with 50/50 peroxide soaks.  -OR Wound Cx: Positive for Group B Strep, Rare Staph Aureus, Rare Klebsiella, management per ID  -Pain Control  -DVT ppx per primary  -IV abx per ID/primary  -FU AM Labs  -Rest, ice, compress and elevate the extremity as needed  -Incentive Spirometry  -Medical management appreciated

## 2023-09-21 ENCOUNTER — TRANSCRIPTION ENCOUNTER (OUTPATIENT)
Age: 63
End: 2023-09-21

## 2023-09-21 LAB
ANION GAP SERPL CALC-SCNC: 3 MMOL/L — LOW (ref 5–17)
BUN SERPL-MCNC: 15 MG/DL — SIGNIFICANT CHANGE UP (ref 7–23)
CALCIUM SERPL-MCNC: 9.2 MG/DL — SIGNIFICANT CHANGE UP (ref 8.5–10.1)
CHLORIDE SERPL-SCNC: 100 MMOL/L — SIGNIFICANT CHANGE UP (ref 96–108)
CO2 SERPL-SCNC: 29 MMOL/L — SIGNIFICANT CHANGE UP (ref 22–31)
CREAT SERPL-MCNC: 0.57 MG/DL — SIGNIFICANT CHANGE UP (ref 0.5–1.3)
EGFR: 111 ML/MIN/1.73M2 — SIGNIFICANT CHANGE UP
GLUCOSE BLDC GLUCOMTR-MCNC: 169 MG/DL — HIGH (ref 70–99)
GLUCOSE BLDC GLUCOMTR-MCNC: 193 MG/DL — HIGH (ref 70–99)
GLUCOSE BLDC GLUCOMTR-MCNC: 278 MG/DL — HIGH (ref 70–99)
GLUCOSE BLDC GLUCOMTR-MCNC: 317 MG/DL — HIGH (ref 70–99)
GLUCOSE SERPL-MCNC: 192 MG/DL — HIGH (ref 70–99)
HCT VFR BLD CALC: 36.4 % — LOW (ref 39–50)
HGB BLD-MCNC: 12.5 G/DL — LOW (ref 13–17)
MCHC RBC-ENTMCNC: 31.1 PG — SIGNIFICANT CHANGE UP (ref 27–34)
MCHC RBC-ENTMCNC: 34.3 GM/DL — SIGNIFICANT CHANGE UP (ref 32–36)
MCV RBC AUTO: 90.5 FL — SIGNIFICANT CHANGE UP (ref 80–100)
PLATELET # BLD AUTO: 541 K/UL — HIGH (ref 150–400)
POTASSIUM SERPL-MCNC: 3.8 MMOL/L — SIGNIFICANT CHANGE UP (ref 3.5–5.3)
POTASSIUM SERPL-SCNC: 3.8 MMOL/L — SIGNIFICANT CHANGE UP (ref 3.5–5.3)
RBC # BLD: 4.02 M/UL — LOW (ref 4.2–5.8)
RBC # FLD: 11.4 % — SIGNIFICANT CHANGE UP (ref 10.3–14.5)
SODIUM SERPL-SCNC: 132 MMOL/L — LOW (ref 135–145)
WBC # BLD: 5.01 K/UL — SIGNIFICANT CHANGE UP (ref 3.8–10.5)
WBC # FLD AUTO: 5.01 K/UL — SIGNIFICANT CHANGE UP (ref 3.8–10.5)

## 2023-09-21 PROCEDURE — 99232 SBSQ HOSP IP/OBS MODERATE 35: CPT

## 2023-09-21 RX ADMIN — Medication 975 MILLIGRAM(S): at 22:10

## 2023-09-21 RX ADMIN — POLYETHYLENE GLYCOL 3350 17 GRAM(S): 17 POWDER, FOR SOLUTION ORAL at 11:07

## 2023-09-21 RX ADMIN — ENOXAPARIN SODIUM 40 MILLIGRAM(S): 100 INJECTION SUBCUTANEOUS at 06:06

## 2023-09-21 RX ADMIN — SENNA PLUS 2 TABLET(S): 8.6 TABLET ORAL at 22:10

## 2023-09-21 RX ADMIN — Medication 166.67 MILLIGRAM(S): at 11:06

## 2023-09-21 RX ADMIN — Medication 2: at 08:56

## 2023-09-21 RX ADMIN — Medication 8: at 16:52

## 2023-09-21 RX ADMIN — CEFTRIAXONE 2000 MILLIGRAM(S): 500 INJECTION, POWDER, FOR SOLUTION INTRAMUSCULAR; INTRAVENOUS at 11:06

## 2023-09-21 RX ADMIN — Medication 1 APPLICATION(S): at 22:10

## 2023-09-21 RX ADMIN — Medication 1 APPLICATION(S): at 11:06

## 2023-09-21 RX ADMIN — Medication 166.67 MILLIGRAM(S): at 00:00

## 2023-09-21 RX ADMIN — LISINOPRIL 5 MILLIGRAM(S): 2.5 TABLET ORAL at 11:07

## 2023-09-21 RX ADMIN — INSULIN GLARGINE 20 UNIT(S): 100 INJECTION, SOLUTION SUBCUTANEOUS at 22:10

## 2023-09-21 RX ADMIN — Medication 975 MILLIGRAM(S): at 06:03

## 2023-09-21 RX ADMIN — Medication 166.67 MILLIGRAM(S): at 22:11

## 2023-09-21 NOTE — PROGRESS NOTE ADULT - SUBJECTIVE AND OBJECTIVE BOX
HOSPITALIST ATTENDING PROGRESS NOTE    Chart and meds reviewed.  Patient seen and examined.    CC: OM    Subjective: Feels well, no pain or numbness. No fever.     All other systems reviewed and found to be negative with the exception of what has been described above.    MEDICATIONS  (STANDING):  acetaminophen     Tablet .. 975 milliGRAM(s) Oral every 8 hours  bacitracin   Ointment 1 Application(s) Topical two times a day  cefTRIAXone Injectable. 2000 milliGRAM(s) IV Push every 24 hours  dextrose 5%. 1000 milliLiter(s) (100 mL/Hr) IV Continuous <Continuous>  dextrose 5%. 1000 milliLiter(s) (50 mL/Hr) IV Continuous <Continuous>  dextrose 50% Injectable 12.5 Gram(s) IV Push once  dextrose 50% Injectable 25 Gram(s) IV Push once  dextrose 50% Injectable 25 Gram(s) IV Push once  enoxaparin Injectable 40 milliGRAM(s) SubCutaneous every 24 hours  glucagon  Injectable 1 milliGRAM(s) IntraMuscular once  hydrogen peroxide 3% Solution 1 Application(s) Topical two times a day  influenza   Vaccine 0.5 milliLiter(s) IntraMuscular once  insulin glargine Injectable (LANTUS) 20 Unit(s) SubCutaneous at bedtime  insulin lispro (ADMELOG) corrective regimen sliding scale   SubCutaneous three times a day before meals  insulin lispro (ADMELOG) corrective regimen sliding scale   SubCutaneous at bedtime  lisinopril 5 milliGRAM(s) Oral daily  polyethylene glycol 3350 17 Gram(s) Oral daily  senna 2 Tablet(s) Oral at bedtime  sodium chloride 0.9%. 1000 milliLiter(s) (75 mL/Hr) IV Continuous <Continuous>  vancomycin  IVPB 1250 milliGRAM(s) IV Intermittent every 12 hours    MEDICATIONS  (PRN):  acetaminophen     Tablet .. 650 milliGRAM(s) Oral every 6 hours PRN Temp greater or equal to 38C (100.4F), Mild Pain (1 - 3)  dextrose Oral Gel 15 Gram(s) Oral once PRN Blood Glucose LESS THAN 70 milliGRAM(s)/deciliter  melatonin 3 milliGRAM(s) Oral at bedtime PRN Insomnia  ondansetron Injectable 4 milliGRAM(s) IV Push every 8 hours PRN Nausea and/or Vomiting  oxyCODONE    IR 2.5 milliGRAM(s) Oral every 4 hours PRN Moderate Pain (4 - 6)      VITALS:  T(F): 97.7 (09-21-23 @ 07:51), Max: 98.4 (09-20-23 @ 23:00)  HR: 77 (09-21-23 @ 07:51) (77 - 93)  BP: 113/82 (09-21-23 @ 07:51) (99/65 - 113/82)  RR: 17 (09-21-23 @ 07:51) (17 - 18)  SpO2: 93% (09-21-23 @ 07:51) (93% - 100%)  Wt(kg): --    I&O's Summary      CAPILLARY BLOOD GLUCOSE      POCT Blood Glucose.: 193 mg/dL (21 Sep 2023 08:18)  POCT Blood Glucose.: 209 mg/dL (20 Sep 2023 22:10)  POCT Blood Glucose.: 126 mg/dL (20 Sep 2023 17:45)  POCT Blood Glucose.: 204 mg/dL (20 Sep 2023 14:03)      PHYSICAL EXAM:  GEN: NAD  HEENT:  pupils equal and reactive, EOMI, no oropharyngeal lesions, erythema, exudates, oral thrush  NECK:   supple, no carotid bruits, no palpable lymph nodes, no thyromegaly  CV:  +S1, +S2, regular, no murmurs or rubs  RESP:   lungs clear to auscultation bilaterally, no wheezing, rales, rhonchi, good air entry bilaterally  BREAST:  not examined  GI:  abdomen soft, non-tender, non-distended, normal BS, no bruits, no abdominal masses, no palpable masses  RECTAL:  not examined  :  not examined  MSK:   normal muscle tone, no atrophy, no rigidity, no contractions  EXT:  no clubbing, no cyanosis, no edema, no calf pain, swelling or erythema. Hand dressing CDi  VASCULAR:  pulses equal and symmetric in the upper and lower extremities  NEURO:  AAOX3, no focal neurological deficits, follows all commands, able to move extremities spontaneously  SKIN:  no ulcers, lesions or rashes    LABS:                            12.5   5.01  )-----------( 541      ( 21 Sep 2023 06:09 )             36.4     09-21    132<L>  |  100  |  15  ----------------------------<  192<H>  3.8   |  29  |  0.57    Ca    9.2      21 Sep 2023 06:09      Urinalysis Basic - ( 21 Sep 2023 06:09 )  Color: x / Appearance: x / SG: x / pH: x  Gluc: 192 mg/dL / Ketone: x  / Bili: x / Urobili: x   Blood: x / Protein: x / Nitrite: x   Leuk Esterase: x / RBC: x / WBC x   Sq Epi: x / Non Sq Epi: x / Bacteria: x

## 2023-09-21 NOTE — PROGRESS NOTE ADULT - SUBJECTIVE AND OBJECTIVE BOX
Patient seen and examined at bedside. Pain well controlled with medication. Patient denies any numbness, tingling, weakness, or any other orthopaedic complaint.     LABS:                        12.5   5.01  )-----------( 541      ( 21 Sep 2023 06:09 )             36.4     09-21    132<L>  |  100  |  15  ----------------------------<  192<H>  3.8   |  29  |  0.57    Ca    9.2      21 Sep 2023 06:09    Urinalysis Basic - ( 21 Sep 2023 06:09 )    Color: x / Appearance: x / SG: x / pH: x  Gluc: 192 mg/dL / Ketone: x  / Bili: x / Urobili: x   Blood: x / Protein: x / Nitrite: x   Leuk Esterase: x / RBC: x / WBC x   Sq Epi: x / Non Sq Epi: x / Bacteria: x    VITAL SIGNS:  T(C): 36.5 (09-21-23 @ 07:51), Max: 36.9 (09-20-23 @ 23:00)  HR: 77 (09-21-23 @ 07:51) (77 - 93)  BP: 113/82 (09-21-23 @ 07:51) (99/65 - 113/82)  RR: 17 (09-21-23 @ 07:51) (17 - 18)  SpO2: 93% (09-21-23 @ 07:51) (93% - 100%)    General: NAD, resting comfortably in bed    LUE:   Dressing C/D/I  +AIN/PIN/U/R/MSK/Ax  SILT C5-T1  cap refill <2sec  Compartments soft and compressible  No calf tenderness bilaterally    A/P:  63y m s/p L Middle Finger Amp at level of PIP POD4    -PT/OT   -NWB LUE  -Continue Advancing betadine packing  -50/50 peroxide soaks. Please have digit soaked for about 15min.  -OR Wound Cx: Positive for Group B Strep, Rare Staph Aureus, Rare Klebsiella, management per ID  -Pain Control  -DVT ppx per primary  -IV abx per ID/primary  -FU AM Labs  -Rest, ice, compress and elevate the extremity as needed  -Incentive Spirometry  -Medical management appreciated

## 2023-09-21 NOTE — DISCHARGE NOTE NURSING/CASE MANAGEMENT/SOCIAL WORK - PATIENT PORTAL LINK FT
You can access the FollowMyHealth Patient Portal offered by [facility] by registering at the following website: [email or web address]. By joining [facility]’s FollowMyHealth portal, you will also be able to view your health information using other applications (apps) compatible with our system.

## 2023-09-21 NOTE — PROGRESS NOTE ADULT - ASSESSMENT
64 yo male who denies any medical problems, who presents to ED due to worsening infection of left hand. Pt states he injured his hand about three months ago and did not see a doctor or go to hospital due to lack of insurance/funds. Pt states he was hurt and since has been treating at home with salves, home remedies but nothing helps. Pt endorses having chills and feeling ill but not sure if febrile. States there is a discharge from the hand that is at times malodorous. States he is also having pain but not since his hand being wrapped in ED.     # Osteomyelitis and cellulitis of left hand + Necrosis of left 3rd finger tip + Flexor Tenosynovitis of 3rd digit:   - ESR elevated  - c/w Rocephin and Vanco  - Tylenol prn pain, fever  - Percocet prn severe pain  - s/p left middle finger  amputation 9/17  - wound culture reviewed  - When stable from ortho, may DC on ceftin PO BID x 2 weeks.    #New onset Diabetes mellitus: uncontrolled  - Basal Bolus  - No insurance, will likely try and DC on 3 oral meds    # DVT PPX:   - lovenox

## 2023-09-22 LAB
ANION GAP SERPL CALC-SCNC: 5 MMOL/L — SIGNIFICANT CHANGE UP (ref 5–17)
BUN SERPL-MCNC: 14 MG/DL — SIGNIFICANT CHANGE UP (ref 7–23)
CALCIUM SERPL-MCNC: 9.3 MG/DL — SIGNIFICANT CHANGE UP (ref 8.5–10.1)
CHLORIDE SERPL-SCNC: 102 MMOL/L — SIGNIFICANT CHANGE UP (ref 96–108)
CO2 SERPL-SCNC: 26 MMOL/L — SIGNIFICANT CHANGE UP (ref 22–31)
CREAT SERPL-MCNC: 0.55 MG/DL — SIGNIFICANT CHANGE UP (ref 0.5–1.3)
CULTURE RESULTS: SIGNIFICANT CHANGE UP
EGFR: 112 ML/MIN/1.73M2 — SIGNIFICANT CHANGE UP
GLUCOSE BLDC GLUCOMTR-MCNC: 194 MG/DL — HIGH (ref 70–99)
GLUCOSE BLDC GLUCOMTR-MCNC: 216 MG/DL — HIGH (ref 70–99)
GLUCOSE BLDC GLUCOMTR-MCNC: 261 MG/DL — HIGH (ref 70–99)
GLUCOSE BLDC GLUCOMTR-MCNC: 288 MG/DL — HIGH (ref 70–99)
GLUCOSE SERPL-MCNC: 227 MG/DL — HIGH (ref 70–99)
HCT VFR BLD CALC: 36.6 % — LOW (ref 39–50)
HGB BLD-MCNC: 12.6 G/DL — LOW (ref 13–17)
MCHC RBC-ENTMCNC: 31 PG — SIGNIFICANT CHANGE UP (ref 27–34)
MCHC RBC-ENTMCNC: 34.4 GM/DL — SIGNIFICANT CHANGE UP (ref 32–36)
MCV RBC AUTO: 90.1 FL — SIGNIFICANT CHANGE UP (ref 80–100)
ORGANISM # SPEC MICROSCOPIC CNT: SIGNIFICANT CHANGE UP
PLATELET # BLD AUTO: 532 K/UL — HIGH (ref 150–400)
POTASSIUM SERPL-MCNC: 3.9 MMOL/L — SIGNIFICANT CHANGE UP (ref 3.5–5.3)
POTASSIUM SERPL-SCNC: 3.9 MMOL/L — SIGNIFICANT CHANGE UP (ref 3.5–5.3)
RBC # BLD: 4.06 M/UL — LOW (ref 4.2–5.8)
RBC # FLD: 11.5 % — SIGNIFICANT CHANGE UP (ref 10.3–14.5)
SODIUM SERPL-SCNC: 133 MMOL/L — LOW (ref 135–145)
SPECIMEN SOURCE: SIGNIFICANT CHANGE UP
WBC # BLD: 5.35 K/UL — SIGNIFICANT CHANGE UP (ref 3.8–10.5)
WBC # FLD AUTO: 5.35 K/UL — SIGNIFICANT CHANGE UP (ref 3.8–10.5)

## 2023-09-22 PROCEDURE — 99232 SBSQ HOSP IP/OBS MODERATE 35: CPT

## 2023-09-22 RX ORDER — SITAGLIPTIN 50 MG/1
1 TABLET, FILM COATED ORAL
Qty: 30 | Refills: 0
Start: 2023-09-22 | End: 2023-10-21

## 2023-09-22 RX ORDER — CEFUROXIME AXETIL 250 MG
1 TABLET ORAL
Qty: 28 | Refills: 0
Start: 2023-09-22 | End: 2023-10-05

## 2023-09-22 RX ORDER — LISINOPRIL 2.5 MG/1
1 TABLET ORAL
Qty: 30 | Refills: 0
Start: 2023-09-22 | End: 2023-10-21

## 2023-09-22 RX ORDER — METFORMIN HYDROCHLORIDE 850 MG/1
1 TABLET ORAL
Qty: 60 | Refills: 0
Start: 2023-09-22 | End: 2023-10-21

## 2023-09-22 RX ADMIN — Medication 166.67 MILLIGRAM(S): at 21:53

## 2023-09-22 RX ADMIN — Medication 975 MILLIGRAM(S): at 15:53

## 2023-09-22 RX ADMIN — Medication 975 MILLIGRAM(S): at 05:40

## 2023-09-22 RX ADMIN — HYDROGEN PEROXIDE 1 APPLICATION(S): 0.3 LIQUID TOPICAL at 12:52

## 2023-09-22 RX ADMIN — SENNA PLUS 2 TABLET(S): 8.6 TABLET ORAL at 21:53

## 2023-09-22 RX ADMIN — Medication 166.67 MILLIGRAM(S): at 09:46

## 2023-09-22 RX ADMIN — Medication 3: at 21:52

## 2023-09-22 RX ADMIN — Medication 1 APPLICATION(S): at 12:51

## 2023-09-22 RX ADMIN — CEFTRIAXONE 2000 MILLIGRAM(S): 500 INJECTION, POWDER, FOR SOLUTION INTRAMUSCULAR; INTRAVENOUS at 09:41

## 2023-09-22 RX ADMIN — LISINOPRIL 5 MILLIGRAM(S): 2.5 TABLET ORAL at 09:41

## 2023-09-22 RX ADMIN — POLYETHYLENE GLYCOL 3350 17 GRAM(S): 17 POWDER, FOR SOLUTION ORAL at 09:40

## 2023-09-22 RX ADMIN — Medication 975 MILLIGRAM(S): at 21:51

## 2023-09-22 RX ADMIN — INSULIN GLARGINE 20 UNIT(S): 100 INJECTION, SOLUTION SUBCUTANEOUS at 21:52

## 2023-09-22 RX ADMIN — ENOXAPARIN SODIUM 40 MILLIGRAM(S): 100 INJECTION SUBCUTANEOUS at 05:41

## 2023-09-22 RX ADMIN — Medication 4: at 17:49

## 2023-09-22 RX ADMIN — Medication 975 MILLIGRAM(S): at 14:53

## 2023-09-22 RX ADMIN — Medication 6: at 12:48

## 2023-09-22 NOTE — PROGRESS NOTE ADULT - SUBJECTIVE AND OBJECTIVE BOX
HOSPITALIST ATTENDING PROGRESS NOTE    Chart and meds reviewed.  Patient seen and examined.    CC:    Subjective:    All other systems reviewed and found to be negative with the exception of what has been described above.    MEDICATIONS  (STANDING):  acetaminophen     Tablet .. 975 milliGRAM(s) Oral every 8 hours  bacitracin   Ointment 1 Application(s) Topical two times a day  cefTRIAXone Injectable. 2000 milliGRAM(s) IV Push every 24 hours  dextrose 5%. 1000 milliLiter(s) (50 mL/Hr) IV Continuous <Continuous>  dextrose 5%. 1000 milliLiter(s) (100 mL/Hr) IV Continuous <Continuous>  dextrose 50% Injectable 12.5 Gram(s) IV Push once  dextrose 50% Injectable 25 Gram(s) IV Push once  dextrose 50% Injectable 25 Gram(s) IV Push once  enoxaparin Injectable 40 milliGRAM(s) SubCutaneous every 24 hours  glucagon  Injectable 1 milliGRAM(s) IntraMuscular once  hydrogen peroxide 3% Solution 1 Application(s) Topical two times a day  influenza   Vaccine 0.5 milliLiter(s) IntraMuscular once  insulin glargine Injectable (LANTUS) 20 Unit(s) SubCutaneous at bedtime  insulin lispro (ADMELOG) corrective regimen sliding scale   SubCutaneous at bedtime  insulin lispro (ADMELOG) corrective regimen sliding scale   SubCutaneous three times a day before meals  lisinopril 5 milliGRAM(s) Oral daily  polyethylene glycol 3350 17 Gram(s) Oral daily  senna 2 Tablet(s) Oral at bedtime  sodium chloride 0.9%. 1000 milliLiter(s) (75 mL/Hr) IV Continuous <Continuous>  vancomycin  IVPB 1250 milliGRAM(s) IV Intermittent every 12 hours    MEDICATIONS  (PRN):  acetaminophen     Tablet .. 650 milliGRAM(s) Oral every 6 hours PRN Temp greater or equal to 38C (100.4F), Mild Pain (1 - 3)  dextrose Oral Gel 15 Gram(s) Oral once PRN Blood Glucose LESS THAN 70 milliGRAM(s)/deciliter  melatonin 3 milliGRAM(s) Oral at bedtime PRN Insomnia  ondansetron Injectable 4 milliGRAM(s) IV Push every 8 hours PRN Nausea and/or Vomiting  oxyCODONE    IR 2.5 milliGRAM(s) Oral every 4 hours PRN Moderate Pain (4 - 6)      VITALS:  T(F): 97.9 (09-22-23 @ 08:35), Max: 98.1 (09-21-23 @ 23:46)  HR: 83 (09-22-23 @ 08:35) (83 - 97)  BP: 109/74 (09-22-23 @ 08:35) (106/70 - 111/72)  RR: 18 (09-22-23 @ 08:35) (17 - 18)  SpO2: 97% (09-22-23 @ 08:35) (97% - 97%)  Wt(kg): --    I&O's Summary      CAPILLARY BLOOD GLUCOSE      POCT Blood Glucose.: 194 mg/dL (22 Sep 2023 08:32)  POCT Blood Glucose.: 278 mg/dL (21 Sep 2023 22:09)  POCT Blood Glucose.: 317 mg/dL (21 Sep 2023 16:40)  POCT Blood Glucose.: 169 mg/dL (21 Sep 2023 12:52)      PHYSICAL EXAM:  HEENT:  pupils equal and reactive, EOMI, no oropharyngeal lesions, erythema, exudates, oral thrush  NECK:   supple, no carotid bruits, no palpable lymph nodes, no thyromegaly  CV:  +S1, +S2, regular, no murmurs or rubs  RESP:   lungs clear to auscultation bilaterally, no wheezing, rales, rhonchi, good air entry bilaterally  BREAST:  not examined  GI:  abdomen soft, non-tender, non-distended, normal BS, no bruits, no abdominal masses, no palpable masses  RECTAL:  not examined  :  not examined  MSK:   normal muscle tone, no atrophy, no rigidity, no contractions  EXT:  no clubbing, no cyanosis, no edema, no calf pain, swelling or erythema  VASCULAR:  pulses equal and symmetric in the upper and lower extremities  NEURO:  AAOX3, no focal neurological deficits, follows all commands, able to move extremities spontaneously  SKIN:  no ulcers, lesions or rashes    LABS:                            12.6   5.35  )-----------( 532      ( 22 Sep 2023 07:45 )             36.6     09-22    133<L>  |  102  |  14  ----------------------------<  227<H>  3.9   |  26  |  0.55    Ca    9.3      22 Sep 2023 07:45      Urinalysis Basic - ( 22 Sep 2023 07:45 )  Color: x / Appearance: x / SG: x / pH: x  Gluc: 227 mg/dL / Ketone: x  / Bili: x / Urobili: x   Blood: x / Protein: x / Nitrite: x   Leuk Esterase: x / RBC: x / WBC x   Sq Epi: x / Non Sq Epi: x / Bacteria: x

## 2023-09-22 NOTE — PROGRESS NOTE ADULT - SUBJECTIVE AND OBJECTIVE BOX
Patient seen and examined at bedside. Pain well controlled with medication. Patient denies any fevers/chill numbness, tingling, weakness, or any other orthopaedic complaint.     LABS:                        12.6   5.35  )-----------( 532      ( 22 Sep 2023 07:45 )             36.6     09-21    132<L>  |  100  |  15  ----------------------------<  192<H>  3.8   |  29  |  0.57    Ca    9.2      21 Sep 2023 06:09        Urinalysis Basic - ( 21 Sep 2023 06:09 )    Color: x / Appearance: x / SG: x / pH: x  Gluc: 192 mg/dL / Ketone: x  / Bili: x / Urobili: x   Blood: x / Protein: x / Nitrite: x   Leuk Esterase: x / RBC: x / WBC x   Sq Epi: x / Non Sq Epi: x / Bacteria: x        VITAL SIGNS:  T(C): 36.7 (09-21-23 @ 23:46), Max: 36.7 (09-21-23 @ 23:46)  HR: 97 (09-21-23 @ 23:46) (93 - 97)  BP: 111/72 (09-21-23 @ 23:46) (106/70 - 111/72)  RR: 18 (09-21-23 @ 23:46) (17 - 18)  SpO2: 97% (09-21-23 @ 23:46) (97% - 97%)    General: NAD, resting comfortably in bed    LUE:   Dressing C/D/I  +AIN/PIN/U/R/MSK/Ax  SILT C5-T1  cap refill <2sec  Compartments soft and compressible  No calf tenderness bilaterally    A/P:  63y m s/p L Middle Finger Amp at level of PIP POD5    -PT/OT   -NWB LUE  -Packing removed on 9/21  -50/50 peroxide soaks Two Times Per Day. Please have digit soaked for about 15-20min. Dry Dressing applied with Coban  -OR Wound Cx: Positive for Group B Strep, Rare Staph Aureus, Rare Klebsiella, management per ID  -IV abx per ID/primary  -FU ID Recs For eventual transition to PO Antibiotics vs PICC line placement.   -Pain Control  -DVT ppx per primary  -FU AM Labs  -Rest, ice, compress and elevate the extremity as needed  -Incentive Spirometry  -Medical management appreciated    Discussed with Dr. Garcia who is aware and agrees with above plan.

## 2023-09-23 VITALS
OXYGEN SATURATION: 100 % | HEART RATE: 105 BPM | RESPIRATION RATE: 18 BRPM | SYSTOLIC BLOOD PRESSURE: 105 MMHG | DIASTOLIC BLOOD PRESSURE: 76 MMHG | TEMPERATURE: 99 F

## 2023-09-23 LAB
ANION GAP SERPL CALC-SCNC: 7 MMOL/L — SIGNIFICANT CHANGE UP (ref 5–17)
BUN SERPL-MCNC: 15 MG/DL — SIGNIFICANT CHANGE UP (ref 7–23)
CALCIUM SERPL-MCNC: 9.2 MG/DL — SIGNIFICANT CHANGE UP (ref 8.5–10.1)
CHLORIDE SERPL-SCNC: 101 MMOL/L — SIGNIFICANT CHANGE UP (ref 96–108)
CO2 SERPL-SCNC: 25 MMOL/L — SIGNIFICANT CHANGE UP (ref 22–31)
CREAT SERPL-MCNC: 0.57 MG/DL — SIGNIFICANT CHANGE UP (ref 0.5–1.3)
EGFR: 111 ML/MIN/1.73M2 — SIGNIFICANT CHANGE UP
GLUCOSE BLDC GLUCOMTR-MCNC: 225 MG/DL — HIGH (ref 70–99)
GLUCOSE BLDC GLUCOMTR-MCNC: 304 MG/DL — HIGH (ref 70–99)
GLUCOSE SERPL-MCNC: 210 MG/DL — HIGH (ref 70–99)
HCT VFR BLD CALC: 35.4 % — LOW (ref 39–50)
HGB BLD-MCNC: 12.4 G/DL — LOW (ref 13–17)
MCHC RBC-ENTMCNC: 31.2 PG — SIGNIFICANT CHANGE UP (ref 27–34)
MCHC RBC-ENTMCNC: 35 GM/DL — SIGNIFICANT CHANGE UP (ref 32–36)
MCV RBC AUTO: 89.2 FL — SIGNIFICANT CHANGE UP (ref 80–100)
PLATELET # BLD AUTO: 527 K/UL — HIGH (ref 150–400)
POTASSIUM SERPL-MCNC: 3.9 MMOL/L — SIGNIFICANT CHANGE UP (ref 3.5–5.3)
POTASSIUM SERPL-SCNC: 3.9 MMOL/L — SIGNIFICANT CHANGE UP (ref 3.5–5.3)
RBC # BLD: 3.97 M/UL — LOW (ref 4.2–5.8)
RBC # FLD: 11.4 % — SIGNIFICANT CHANGE UP (ref 10.3–14.5)
SODIUM SERPL-SCNC: 133 MMOL/L — LOW (ref 135–145)
WBC # BLD: 4.93 K/UL — SIGNIFICANT CHANGE UP (ref 3.8–10.5)
WBC # FLD AUTO: 4.93 K/UL — SIGNIFICANT CHANGE UP (ref 3.8–10.5)

## 2023-09-23 PROCEDURE — 99239 HOSP IP/OBS DSCHRG MGMT >30: CPT

## 2023-09-23 RX ORDER — METFORMIN HYDROCHLORIDE 850 MG/1
1 TABLET ORAL
Qty: 60 | Refills: 0
Start: 2023-09-23 | End: 2023-10-22

## 2023-09-23 RX ORDER — SITAGLIPTIN 50 MG/1
1 TABLET, FILM COATED ORAL
Qty: 30 | Refills: 0
Start: 2023-09-23 | End: 2023-10-22

## 2023-09-23 RX ORDER — CEFUROXIME AXETIL 250 MG
1 TABLET ORAL
Qty: 28 | Refills: 0
Start: 2023-09-23 | End: 2023-10-06

## 2023-09-23 RX ORDER — LISINOPRIL 2.5 MG/1
1 TABLET ORAL
Qty: 30 | Refills: 0
Start: 2023-09-23 | End: 2023-10-22

## 2023-09-23 RX ADMIN — Medication 4: at 08:23

## 2023-09-23 RX ADMIN — HYDROGEN PEROXIDE 1 APPLICATION(S): 0.3 LIQUID TOPICAL at 09:24

## 2023-09-23 RX ADMIN — Medication 975 MILLIGRAM(S): at 13:42

## 2023-09-23 RX ADMIN — Medication 1 APPLICATION(S): at 09:22

## 2023-09-23 RX ADMIN — Medication 975 MILLIGRAM(S): at 05:18

## 2023-09-23 RX ADMIN — Medication 8: at 11:58

## 2023-09-23 RX ADMIN — ENOXAPARIN SODIUM 40 MILLIGRAM(S): 100 INJECTION SUBCUTANEOUS at 05:19

## 2023-09-23 RX ADMIN — POLYETHYLENE GLYCOL 3350 17 GRAM(S): 17 POWDER, FOR SOLUTION ORAL at 09:23

## 2023-09-23 RX ADMIN — CEFTRIAXONE 2000 MILLIGRAM(S): 500 INJECTION, POWDER, FOR SOLUTION INTRAMUSCULAR; INTRAVENOUS at 09:25

## 2023-09-23 RX ADMIN — Medication 166.67 MILLIGRAM(S): at 09:22

## 2023-09-23 NOTE — PROGRESS NOTE ADULT - NUTRITIONAL ASSESSMENT
This patient has been assessed with a concern for Malnutrition and has been determined to have a diagnosis/diagnoses of Moderate protein-calorie malnutrition.    This patient is being managed with:   Diet Regular-  Entered: Sep 17 2023  1:14PM  
This patient has been assessed with a concern for Malnutrition and has been determined to have a diagnosis/diagnoses of Moderate protein-calorie malnutrition.    This patient is being managed with:   Diet Soft and Bite Sized-  Consistent Carbohydrate {Evening Snack} (CSTCHOSN)  Entered: Sep 14 2023 10:20AM  
This patient has been assessed with a concern for Malnutrition and has been determined to have a diagnosis/diagnoses of Moderate protein-calorie malnutrition.    This patient is being managed with:   Diet Soft and Bite Sized-  Consistent Carbohydrate {Evening Snack} (CSTCHOSN)  Entered: Sep 14 2023 10:20AM  
This patient has been assessed with a concern for Malnutrition and has been determined to have a diagnosis/diagnoses of Moderate protein-calorie malnutrition.    This patient is being managed with:   Diet NPO after Midnight-     NPO Start Date: 16-Sep-2023   NPO Start Time: 23:59  Entered: Sep 16 2023  8:54AM    Diet Soft and Bite Sized-  Consistent Carbohydrate {Evening Snack} (CSTCHOSN)  Entered: Sep 14 2023 10:20AM  
This patient has been assessed with a concern for Malnutrition and has been determined to have a diagnosis/diagnoses of Moderate protein-calorie malnutrition.    This patient is being managed with:   Diet Regular-  Entered: Sep 17 2023  1:14PM  
This patient has been assessed with a concern for Malnutrition and has been determined to have a diagnosis/diagnoses of Moderate protein-calorie malnutrition.    This patient is being managed with:   Diet Soft and Bite Sized-  Consistent Carbohydrate {Evening Snack} (CSTCHOSN)  Entered: Sep 14 2023 10:20AM

## 2023-09-23 NOTE — PROGRESS NOTE ADULT - SUBJECTIVE AND OBJECTIVE BOX
Patient seen and examined at bedside. Pain well controlled with medication. Patient denies any fevers/chill numbness, tingling, weakness, or any other orthopaedic complaint.     LABS:                        12.4   4.93  )-----------( 527      ( 23 Sep 2023 06:41 )             35.4     09-23    133<L>  |  101  |  15  ----------------------------<  210<H>  3.9   |  25  |  0.57    Ca    9.2      23 Sep 2023 06:41        Urinalysis Basic - ( 23 Sep 2023 06:41 )    Color: x / Appearance: x / SG: x / pH: x  Gluc: 210 mg/dL / Ketone: x  / Bili: x / Urobili: x   Blood: x / Protein: x / Nitrite: x   Leuk Esterase: x / RBC: x / WBC x   Sq Epi: x / Non Sq Epi: x / Bacteria: x        VITAL SIGNS:  T(C): 37 (09-22-23 @ 23:00), Max: 37 (09-22-23 @ 23:00)  HR: 87 (09-22-23 @ 23:00) (80 - 87)  BP: 105/70 (09-22-23 @ 23:00) (105/70 - 116/70)  RR: 18 (09-22-23 @ 23:00) (18 - 18)  SpO2: 97% (09-22-23 @ 23:00) (97% - 98%)    General: NAD, resting comfortably in bed    LUE:   Dressing C/D/I  +AIN/PIN/U/R/MSK/Ax  SILT C5-T1  Compartments soft and compressible  No calf tenderness bilaterally    A/P:  63y m s/p L Middle Finger Amp at level of PIP POD6    -PT/OT   -NWB LUE  -Packing removed on 9/21  -Continue 50/50 peroxide soaks Two Times Per Day. Please have digit soaked for about 15-20min. Dry Dressing applied with Coban  -OR Wound Cx: Positive for Group B Strep, Rare Staph Aureus, Rare Klebsiella, management per ID  -IV abx per ID/primary  -FU ID Recs For eventual transition to PO Antibiotics  -Pain Control  -DVT ppx per primary  -FU AM Labs  -Rest, ice, compress and elevate the extremity as needed  -Incentive Spirometry  -Medical management appreciated    Discussed with Dr. Garcia who is aware and agrees with above plan.     No acute orthopaedic surgical intervention indicated at this time. This patient is orthopaedically stable for discharge.   Patient to follow up with Dr. Garcia as an outpatient in One Week for further evaluation and management.   All of the patient's questions and concerns were answered and addressed.

## 2023-09-23 NOTE — PROGRESS NOTE ADULT - REASON FOR ADMISSION
Osteomyelitis of left third distal phalanx with cellulitis of third digit and dorsum of hand

## 2023-09-23 NOTE — PROGRESS NOTE ADULT - PROVIDER SPECIALTY LIST ADULT
Hospitalist
Orthopedics
Hospitalist
Orthopedics
Orthopedics
Hospitalist
Hospitalist
Orthopedics
Hospitalist
Hospitalist

## 2023-09-27 LAB — SURGICAL PATHOLOGY STUDY: SIGNIFICANT CHANGE UP

## 2023-10-03 DIAGNOSIS — Z91.190 PATIENT'S NONCOMPLIANCE WITH OTHER MEDICAL TREATMENT AND REGIMEN DUE TO FINANCIAL HARDSHIP: ICD-10-CM

## 2023-10-03 DIAGNOSIS — Z56.0 UNEMPLOYMENT, UNSPECIFIED: ICD-10-CM
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
## 2023-10-03 DIAGNOSIS — I10 ESSENTIAL (PRIMARY) HYPERTENSION: ICD-10-CM

## 2023-10-03 DIAGNOSIS — E44.0 MODERATE PROTEIN-CALORIE MALNUTRITION: ICD-10-CM

## 2023-10-03 DIAGNOSIS — S67.22XA CRUSHING INJURY OF LEFT HAND, INITIAL ENCOUNTER: ICD-10-CM

## 2023-10-03 DIAGNOSIS — Z59.9 PROBLEM RELATED TO HOUSING AND ECONOMIC CIRCUMSTANCES, UNSPECIFIED: ICD-10-CM

## 2023-10-03 DIAGNOSIS — E11.65 TYPE 2 DIABETES MELLITUS WITH HYPERGLYCEMIA: ICD-10-CM

## 2023-10-03 DIAGNOSIS — M65.842 OTHER SYNOVITIS AND TENOSYNOVITIS, LEFT HAND: ICD-10-CM
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
## 2023-10-03 DIAGNOSIS — S01.81XA LACERATION WITHOUT FOREIGN BODY OF OTHER PART OF HEAD, INITIAL ENCOUNTER: ICD-10-CM

## 2023-10-03 DIAGNOSIS — L03.114 CELLULITIS OF LEFT UPPER LIMB: ICD-10-CM

## 2023-10-03 DIAGNOSIS — E11.52 TYPE 2 DIABETES MELLITUS WITH DIABETIC PERIPHERAL ANGIOPATHY WITH GANGRENE: ICD-10-CM

## 2023-10-03 DIAGNOSIS — L98.499 NON-PRESSURE CHRONIC ULCER OF SKIN OF OTHER SITES WITH UNSPECIFIED SEVERITY: ICD-10-CM
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
## 2023-10-03 SDOH — ECONOMIC STABILITY - INCOME SECURITY: UNEMPLOYMENT, UNSPECIFIED: Z56.0

## 2023-10-03 SDOH — ECONOMIC STABILITY - INCOME SECURITY: PROBLEM RELATED TO HOUSING AND ECONOMIC CIRCUMSTANCES, UNSPECIFIED: Z59.9

## 2023-10-18 LAB
CULTURE RESULTS: SIGNIFICANT CHANGE UP
CULTURE RESULTS: SIGNIFICANT CHANGE UP
SPECIMEN SOURCE: SIGNIFICANT CHANGE UP
SPECIMEN SOURCE: SIGNIFICANT CHANGE UP

## 2023-10-25 PROBLEM — K02.9 DENTAL CARIES, UNSPECIFIED: Chronic | Status: ACTIVE | Noted: 2023-09-14

## 2023-10-25 PROBLEM — S69.90XA UNSPECIFIED INJURY OF UNSPECIFIED WRIST, HAND AND FINGER(S), INITIAL ENCOUNTER: Chronic | Status: ACTIVE | Noted: 2023-09-14

## 2023-10-30 NOTE — CDI QUERY NOTE - NSCDIOTHERTXTBX_GEN_ALL_CORE_HH
Please clarify if osteomyelitis ruled in or ruled out?    - Osteomyelitis of left third distal phalanx ruled out  - Osteomyelitis of left third distal phalanx with cellulitis of third digit and dorsum of hand ruled in POA  - Other ( Please specify)      CLINICAL INDICATORS:    Hospitalist progress note on 9/17/2023:  # ?Osteomyelitis and cellulitis of left hand + Necrosis of left 3rd finger tip + Flexor Tenosynovitis of 3rd digit:   admit to medicine    Discharge summary documentation on 9/20/2023:    Reason for Admission  Osteomyelitis of left third distal phalanx with cellulitis of third digit and dorsum of hand      Care Plan/Procedures:  Discharge Diagnoses, Assessment and Plan of Treatment  PRINCIPAL DISCHARGE DIAGNOSIS  Diagnosis: Cellulitis  Assessment and Plan of Treatment: Please continue to take antibiotics as prescribed.      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

## 2023-11-22 ENCOUNTER — APPOINTMENT (OUTPATIENT)
Dept: ORTHOPEDIC SURGERY | Facility: HOSPITAL | Age: 63
End: 2023-11-22

## 2023-11-22 PROBLEM — Z00.00 ENCOUNTER FOR PREVENTIVE HEALTH EXAMINATION: Status: ACTIVE | Noted: 2023-11-22

## 2023-12-30 NOTE — ED PROVIDER NOTE - IV ALTEPLASE DOOR HIDDEN
[facility]   Cardiology     Rita Bermudez Patient Status:  Inpatient    1997 MRN [id number]   Location [facility] CRITICAL CARE UNIT Attending Humera, Subuhi F, DO   Hosp Day # 1 PCP Lonny Jiang DO     Reason for Consultation:  Pulmonary embolism    History of Present Illness:  Rita Bermudez is a a(n) 26 year old female with panniculectomy and abdominoplasty on  who presents here for nonspecific wound infection/fevers.  Ultrasound findings demonstrated DVT in both lower extremities.  There is also a CTA chest done with acute pulmonary embolism with concern for right heart strain.  Currently denies any symptoms of chest pain or shortness of breath.  Not tachycardic, not hypoxic.  No prior history of blood clots.  Her aunt is at her bedside.      History:  History reviewed. No pertinent past medical history.  Past Surgical History:   Procedure Laterality Date    Abdomen surgery       History reviewed. No pertinent family history.   reports that she has never smoked. She has never used smokeless tobacco. She reports that she does not currently use alcohol. She reports that she does not use drugs.    Allergies:  ALLERGIES:   Allergen Reactions    Buspirone Hallucinations    Nystatin RASH       Medications:  Current Facility-Administered Medications   Medication    enoxaparin (LOVENOX) injection 120 mg    sodium chloride 0.9 % injection 10 mL    sodium chloride 0.9 % injection 10 mL    VANCOMYCIN - PHARMACIST MONITORED Misc    diazePAM (VALIUM) tablet 5 mg    gabapentin (NEURONTIN) capsule 300 mg    oxyCODONE (IMM REL) (ROXICODONE) tablet 5 mg    sodium chloride 0.9 % flush bag 25 mL    sodium chloride 0.9 % injection 2 mL    sodium chloride 0.9 % flush bag 25 mL    acetaminophen (TYLENOL) tablet 650 mg    Or    acetaminophen (TYLENOL) suppository 650 mg    prochlorperazine (COMPAZINE) tablet 5 mg    Or    prochlorperazine (COMPAZINE) injection 5 mg     polyethylene glycol (MIRALAX) packet 17 g    docusate sodium-sennosides (SENOKOT S) 50-8.6 MG 2 tablet    bisacodyl (DULCOLAX) suppository 10 mg    magnesium hydroxide (MILK OF MAGNESIA) 400 MG/5ML suspension 30 mL    melatonin tablet 3 mg    Potassium Standard Replacement Protocol (Levels 3.5 and lower)    Magnesium Standard Replacement Protocol    [Held by provider] lactated ringers infusion    Potassium Standard Replacement Protocol (Levels 3.5 and lower)    Magnesium Standard Replacement Protocol    aluminum-magnesium hydroxide-simethicone (MAALOX) 200-200-20 MG/5ML suspension 30 mL    vancomycin 1,750 mg in sodium chloride 0.9% 500 mL IVPB    silver sulfADIAZINE (THERMAZENE) 1 % cream    meropenem (MERREM) 500 mg in sodium chloride 0.9 % 100 mL IVPB    ondansetron (ZOFRAN) injection 4 mg        Review of Systems:  A comprehensive 13 point review of systems was negative, except those mentioned above in the HPI.    Physical Exam:  /63 (BP Location: RFA - Right forearm, Patient Position: Semi-Escobar's)   Pulse 89   Temp 98.4 °F (36.9 °C) (Temporal)   Resp 19   Ht 5' 9\" (1.753 m)   Wt 126 kg (277 lb 12.5 oz)   BMI 41.02 kg/m²   BSA 2.38 m²     Telemetry: Normal sinus rhythm  General: Alert and oriented in no apparent distress.  HEENT: No focal deficits.  Neck: No JVD or carotid bruits  Cardiac: Regular rate and rhythm, no murmur  Lungs: Clear to ausculation, no crackles   Abdomen: Surgical drains/dressings noted  Extremities:  Palp pedal pulses, no edema  Neurologic: Alert and oriented, normal affect.  Skin: Warm and dry.   Psych: cooperative    Laboratory Data:    Recent Labs   Lab 12/29/23  1727 12/29/23  0532 12/28/23  1258 12/28/23  0716   SODIUM  --   --   --  138   POTASSIUM  --   --  4.1 3.4   CHLORIDE  --   --   --  110   CO2  --   --   --  22   BUN  --   --   --  10   CREATININE  --   --   --  0.62   GLUCOSE  --   --   --  109*   CALCIUM  --   --   --  7.0*   WBC 3.8*   < >  --  6.6   RBC  2.68*   < >  --  2.67*   HGB 8.0*   < >  --  7.9*   HCT 24.9*   < >  --  24.8*      < >  --  138*    < > = values in this interval not displayed.       Recent Labs     12/28/23  2242 12/29/23  0719   LACTA 1.5  --    PT  --  13.1*   INR  --  1.2           Imaging/Diagnostics:  EKG:   Sinus tachycardia at 126 bpm  Nonspecific ST-T wave abnormality    Echo:  1. Left ventricle: The cavity size is normal. Wall thickness is normal.     Systolic function is normal. The ejection fraction was measured by biplane     method of disks. The ejection fraction is 65%.  2. Mitral valve: The valve is structurally normal. The annulus is normal. The     leaflets are normal thickness.  3. Right ventricle: The cavity size is normal. Wall thickness is normal.     Systolic function is normal. The tricuspid annular plane systolic excursion     is 2.7cm. The end-diastolic diameter is 2.9cm. The RV pressure during     systole is 30mm Hg.  4. Tricuspid valve: There is trivial regurgitation.  5. Inferior vena cava: The IVC is normal-sized.  IMPRESSIONS:  No signs of right ventricle compromise.     CTA Chest:   Large burden of pulmonary emboli.  2.  Mildly dilated main pulmonary artery and straightening of the  interventricular septum are highly suspicious for right heart strain, but  no contrast reflux into the hepatic veins is seen.  This can be further  assessed with echocardiogram.        Impression:  Pulmonary embolism  DVT  Postop abdominal surgery      Plan:  Patient with submassive pulmonary embolism seen on CTA.  Highly suspicious for right heart strain.  Echocardiogram reviewed, normal LV function, normal RV function, no signs of ventricle compromise.  Biomarkers were also negative.  Currently hemodynamically stable.  Status post recent surgery.  No indication for catheter directed thrombolytics at this time.  Continue to manage with heparin/Lovenox per hematology.  Wait at least 24 hours until transitioning to DOAC  Will  continue to monitor hemodynamically      Thank you, will follow    Bubba Mcmullen MD  Interventional Cardiology  [facility]     Total critical care time: 35 minutes  Due to a high probability of clinically significant, life-threatening deterioration, the patient required my highest level of preparedness to intervene emergently and I have personally spent the critical care time (excluding billable procedure time) directly and personally managing the patient.     show

## 2024-04-02 DIAGNOSIS — Z12.11 ENCOUNTER FOR SCREENING FOR MALIGNANT NEOPLASM OF COLON: ICD-10-CM

## 2024-04-23 ENCOUNTER — NON-APPOINTMENT (OUTPATIENT)
Age: 64
End: 2024-04-23

## 2025-06-22 ENCOUNTER — INPATIENT (INPATIENT)
Facility: HOSPITAL | Age: 65
LOS: 5 days | Discharge: HOME | DRG: 383 | End: 2025-06-28
Attending: HOSPITALIST | Admitting: STUDENT IN AN ORGANIZED HEALTH CARE EDUCATION/TRAINING PROGRAM
Payer: MEDICAID

## 2025-06-22 VITALS
TEMPERATURE: 98 F | DIASTOLIC BLOOD PRESSURE: 67 MMHG | HEART RATE: 102 BPM | RESPIRATION RATE: 18 BRPM | SYSTOLIC BLOOD PRESSURE: 131 MMHG | WEIGHT: 164.46 LBS | OXYGEN SATURATION: 100 %

## 2025-06-22 DIAGNOSIS — L03.90 CELLULITIS, UNSPECIFIED: ICD-10-CM

## 2025-06-22 LAB
ALBUMIN SERPL ELPH-MCNC: 3.7 G/DL — SIGNIFICANT CHANGE UP (ref 3.3–5)
ALP SERPL-CCNC: 105 U/L — SIGNIFICANT CHANGE UP (ref 40–120)
ALT FLD-CCNC: 39 U/L — SIGNIFICANT CHANGE UP (ref 12–78)
ANION GAP SERPL CALC-SCNC: 5 MMOL/L — SIGNIFICANT CHANGE UP (ref 5–17)
APPEARANCE UR: ABNORMAL
APTT BLD: 31.6 SEC — SIGNIFICANT CHANGE UP (ref 26.1–36.8)
AST SERPL-CCNC: 24 U/L — SIGNIFICANT CHANGE UP (ref 15–37)
BACTERIA # UR AUTO: NEGATIVE /HPF — SIGNIFICANT CHANGE UP
BASOPHILS # BLD AUTO: 0.03 K/UL — SIGNIFICANT CHANGE UP (ref 0–0.2)
BASOPHILS NFR BLD AUTO: 0.4 % — SIGNIFICANT CHANGE UP (ref 0–2)
BILIRUB SERPL-MCNC: 0.4 MG/DL — SIGNIFICANT CHANGE UP (ref 0.2–1.2)
BILIRUB UR-MCNC: NEGATIVE — SIGNIFICANT CHANGE UP
BUN SERPL-MCNC: 23 MG/DL — SIGNIFICANT CHANGE UP (ref 7–23)
CALCIUM SERPL-MCNC: 9.5 MG/DL — SIGNIFICANT CHANGE UP (ref 8.5–10.1)
CAST: 1 /LPF — SIGNIFICANT CHANGE UP (ref 0–4)
CHLORIDE SERPL-SCNC: 100 MMOL/L — SIGNIFICANT CHANGE UP (ref 96–108)
CO2 SERPL-SCNC: 28 MMOL/L — SIGNIFICANT CHANGE UP (ref 22–31)
COLOR SPEC: YELLOW — SIGNIFICANT CHANGE UP
CREAT SERPL-MCNC: 1.02 MG/DL — SIGNIFICANT CHANGE UP (ref 0.5–1.3)
CRP SERPL-MCNC: 91.6 MG/L — HIGH (ref 0–5)
DIFF PNL FLD: NEGATIVE — SIGNIFICANT CHANGE UP
EGFR: 83 ML/MIN/1.73M2 — SIGNIFICANT CHANGE UP
EGFR: 83 ML/MIN/1.73M2 — SIGNIFICANT CHANGE UP
EOSINOPHIL # BLD AUTO: 0.06 K/UL — SIGNIFICANT CHANGE UP (ref 0–0.5)
EOSINOPHIL NFR BLD AUTO: 0.8 % — SIGNIFICANT CHANGE UP (ref 0–6)
ERYTHROCYTE [SEDIMENTATION RATE] IN BLOOD: 75 MM/HR — HIGH (ref 0–15)
FLUAV AG NPH QL: SIGNIFICANT CHANGE UP
FLUBV AG NPH QL: SIGNIFICANT CHANGE UP
GLUCOSE SERPL-MCNC: 238 MG/DL — HIGH (ref 70–99)
GLUCOSE UR QL: >=1000 MG/DL
HCT VFR BLD CALC: 37.6 % — LOW (ref 39–50)
HGB BLD-MCNC: 12.5 G/DL — LOW (ref 13–17)
IMM GRANULOCYTES # BLD AUTO: 0.04 K/UL — SIGNIFICANT CHANGE UP (ref 0–0.07)
IMM GRANULOCYTES NFR BLD AUTO: 0.5 % — SIGNIFICANT CHANGE UP (ref 0–0.9)
INR BLD: 0.96 RATIO — SIGNIFICANT CHANGE UP (ref 0.85–1.16)
KETONES UR QL: ABNORMAL MG/DL
LACTATE SERPL-SCNC: 1.2 MMOL/L — SIGNIFICANT CHANGE UP (ref 0.7–2)
LEUKOCYTE ESTERASE UR-ACNC: NEGATIVE — SIGNIFICANT CHANGE UP
LYMPHOCYTES # BLD AUTO: 2.35 K/UL — SIGNIFICANT CHANGE UP (ref 1–3.3)
LYMPHOCYTES NFR BLD AUTO: 32.2 % — SIGNIFICANT CHANGE UP (ref 13–44)
MCHC RBC-ENTMCNC: 30.9 PG — SIGNIFICANT CHANGE UP (ref 27–34)
MCHC RBC-ENTMCNC: 33.2 G/DL — SIGNIFICANT CHANGE UP (ref 32–36)
MCV RBC AUTO: 93.1 FL — SIGNIFICANT CHANGE UP (ref 80–100)
MONOCYTES # BLD AUTO: 0.64 K/UL — SIGNIFICANT CHANGE UP (ref 0–0.9)
MONOCYTES NFR BLD AUTO: 8.8 % — SIGNIFICANT CHANGE UP (ref 2–14)
NEUTROPHILS # BLD AUTO: 4.17 K/UL — SIGNIFICANT CHANGE UP (ref 1.8–7.4)
NEUTROPHILS NFR BLD AUTO: 57.3 % — SIGNIFICANT CHANGE UP (ref 43–77)
NITRITE UR-MCNC: NEGATIVE — SIGNIFICANT CHANGE UP
NRBC # BLD AUTO: 0 K/UL — SIGNIFICANT CHANGE UP (ref 0–0)
NRBC # FLD: 0 K/UL — SIGNIFICANT CHANGE UP (ref 0–0)
NRBC BLD AUTO-RTO: 0 /100 WBCS — SIGNIFICANT CHANGE UP (ref 0–0)
PH UR: 6 — SIGNIFICANT CHANGE UP (ref 5–8)
PLATELET # BLD AUTO: 330 K/UL — SIGNIFICANT CHANGE UP (ref 150–400)
PMV BLD: 8.8 FL — SIGNIFICANT CHANGE UP (ref 7–13)
POTASSIUM SERPL-MCNC: 3.6 MMOL/L — SIGNIFICANT CHANGE UP (ref 3.5–5.3)
POTASSIUM SERPL-SCNC: 3.6 MMOL/L — SIGNIFICANT CHANGE UP (ref 3.5–5.3)
PROT SERPL-MCNC: 8.1 GM/DL — SIGNIFICANT CHANGE UP (ref 6–8.3)
PROT UR-MCNC: 30 MG/DL
PROTHROM AB SERPL-ACNC: 11.3 SEC — SIGNIFICANT CHANGE UP (ref 9.9–13.4)
RBC # BLD: 4.04 M/UL — LOW (ref 4.2–5.8)
RBC # FLD: 13.2 % — SIGNIFICANT CHANGE UP (ref 10.3–14.5)
RBC CASTS # UR COMP ASSIST: 1 /HPF — SIGNIFICANT CHANGE UP (ref 0–4)
RSV RNA NPH QL NAA+NON-PROBE: SIGNIFICANT CHANGE UP
SARS-COV-2 RNA SPEC QL NAA+PROBE: SIGNIFICANT CHANGE UP
SODIUM SERPL-SCNC: 133 MMOL/L — LOW (ref 135–145)
SOURCE RESPIRATORY: SIGNIFICANT CHANGE UP
SP GR SPEC: >1.03 — HIGH (ref 1–1.03)
SQUAMOUS # UR AUTO: 2 /HPF — SIGNIFICANT CHANGE UP (ref 0–5)
UROBILINOGEN FLD QL: 1 MG/DL — SIGNIFICANT CHANGE UP (ref 0.2–1)
WBC # BLD: 7.29 K/UL — SIGNIFICANT CHANGE UP (ref 3.8–10.5)
WBC # FLD AUTO: 7.29 K/UL — SIGNIFICANT CHANGE UP (ref 3.8–10.5)
WBC UR QL: 1 /HPF — SIGNIFICANT CHANGE UP (ref 0–5)

## 2025-06-22 PROCEDURE — 86753 PROTOZOA ANTIBODY NOS: CPT

## 2025-06-22 PROCEDURE — 80048 BASIC METABOLIC PNL TOTAL CA: CPT

## 2025-06-22 PROCEDURE — 80202 ASSAY OF VANCOMYCIN: CPT

## 2025-06-22 PROCEDURE — 87075 CULTR BACTERIA EXCEPT BLOOD: CPT

## 2025-06-22 PROCEDURE — 80061 LIPID PANEL: CPT

## 2025-06-22 PROCEDURE — A9579: CPT

## 2025-06-22 PROCEDURE — 73630 X-RAY EXAM OF FOOT: CPT | Mod: 50

## 2025-06-22 PROCEDURE — 85610 PROTHROMBIN TIME: CPT

## 2025-06-22 PROCEDURE — 36415 COLL VENOUS BLD VENIPUNCTURE: CPT

## 2025-06-22 PROCEDURE — 87484 EHRLICHA CHAFFEENSIS AMP PRB: CPT

## 2025-06-22 PROCEDURE — 85652 RBC SED RATE AUTOMATED: CPT

## 2025-06-22 PROCEDURE — 88304 TISSUE EXAM BY PATHOLOGIST: CPT

## 2025-06-22 PROCEDURE — 87468 ANAPLSMA PHGCYTOPHLM AMP PRB: CPT

## 2025-06-22 PROCEDURE — 86618 LYME DISEASE ANTIBODY: CPT

## 2025-06-22 PROCEDURE — 93923 UPR/LXTR ART STDY 3+ LVLS: CPT

## 2025-06-22 PROCEDURE — 87205 SMEAR GRAM STAIN: CPT

## 2025-06-22 PROCEDURE — 97162 PT EVAL MOD COMPLEX 30 MIN: CPT | Mod: GP

## 2025-06-22 PROCEDURE — 88311 DECALCIFY TISSUE: CPT

## 2025-06-22 PROCEDURE — 87040 BLOOD CULTURE FOR BACTERIA: CPT

## 2025-06-22 PROCEDURE — 87077 CULTURE AEROBIC IDENTIFY: CPT

## 2025-06-22 PROCEDURE — 99222 1ST HOSP IP/OBS MODERATE 55: CPT

## 2025-06-22 PROCEDURE — 88305 TISSUE EXAM BY PATHOLOGIST: CPT

## 2025-06-22 PROCEDURE — 87476 LYME DIS DNA AMP PROBE: CPT

## 2025-06-22 PROCEDURE — 93010 ELECTROCARDIOGRAM REPORT: CPT

## 2025-06-22 PROCEDURE — 86900 BLOOD TYPING SEROLOGIC ABO: CPT

## 2025-06-22 PROCEDURE — 97116 GAIT TRAINING THERAPY: CPT | Mod: GP

## 2025-06-22 PROCEDURE — 86757 RICKETTSIA ANTIBODY: CPT

## 2025-06-22 PROCEDURE — 87102 FUNGUS ISOLATION CULTURE: CPT

## 2025-06-22 PROCEDURE — 83735 ASSAY OF MAGNESIUM: CPT

## 2025-06-22 PROCEDURE — 82962 GLUCOSE BLOOD TEST: CPT

## 2025-06-22 PROCEDURE — 93306 TTE W/DOPPLER COMPLETE: CPT

## 2025-06-22 PROCEDURE — 87186 SC STD MICRODIL/AGAR DIL: CPT

## 2025-06-22 PROCEDURE — 93925 LOWER EXTREMITY STUDY: CPT

## 2025-06-22 PROCEDURE — 73620 X-RAY EXAM OF FOOT: CPT | Mod: 26,50

## 2025-06-22 PROCEDURE — 86850 RBC ANTIBODY SCREEN: CPT

## 2025-06-22 PROCEDURE — 87798 DETECT AGENT NOS DNA AMP: CPT

## 2025-06-22 PROCEDURE — 87070 CULTURE OTHR SPECIMN AEROBIC: CPT

## 2025-06-22 PROCEDURE — 85027 COMPLETE CBC AUTOMATED: CPT

## 2025-06-22 PROCEDURE — 71045 X-RAY EXAM CHEST 1 VIEW: CPT

## 2025-06-22 PROCEDURE — 86666 EHRLICHIA ANTIBODY: CPT

## 2025-06-22 PROCEDURE — 99285 EMERGENCY DEPT VISIT HI MDM: CPT

## 2025-06-22 PROCEDURE — 86140 C-REACTIVE PROTEIN: CPT

## 2025-06-22 PROCEDURE — 93005 ELECTROCARDIOGRAM TRACING: CPT

## 2025-06-22 PROCEDURE — 86901 BLOOD TYPING SEROLOGIC RH(D): CPT

## 2025-06-22 PROCEDURE — 73720 MRI LWR EXTREMITY W/O&W/DYE: CPT | Mod: RT

## 2025-06-22 RX ORDER — MAGNESIUM, ALUMINUM HYDROXIDE 200-200 MG
30 TABLET,CHEWABLE ORAL EVERY 4 HOURS
Refills: 0 | Status: DISCONTINUED | OUTPATIENT
Start: 2025-06-22 | End: 2025-06-28

## 2025-06-22 RX ORDER — LISINOPRIL 5 MG/1
2.5 TABLET ORAL DAILY
Refills: 0 | Status: DISCONTINUED | OUTPATIENT
Start: 2025-06-22 | End: 2025-06-28

## 2025-06-22 RX ORDER — INSULIN LISPRO 100 U/ML
INJECTION, SOLUTION INTRAVENOUS; SUBCUTANEOUS
Refills: 0 | Status: DISCONTINUED | OUTPATIENT
Start: 2025-06-22 | End: 2025-06-25

## 2025-06-22 RX ORDER — SODIUM CHLORIDE 9 G/1000ML
1000 INJECTION, SOLUTION INTRAVENOUS
Refills: 0 | Status: DISCONTINUED | OUTPATIENT
Start: 2025-06-22 | End: 2025-06-28

## 2025-06-22 RX ORDER — ATORVASTATIN CALCIUM 80 MG/1
40 TABLET, FILM COATED ORAL AT BEDTIME
Refills: 0 | Status: DISCONTINUED | OUTPATIENT
Start: 2025-06-22 | End: 2025-06-28

## 2025-06-22 RX ORDER — SODIUM CHLORIDE 9 G/1000ML
2300 INJECTION, SOLUTION INTRAVENOUS ONCE
Refills: 0 | Status: COMPLETED | OUTPATIENT
Start: 2025-06-22 | End: 2025-06-22

## 2025-06-22 RX ORDER — VANCOMYCIN HCL IN 5 % DEXTROSE 1.5G/250ML
1000 PLASTIC BAG, INJECTION (ML) INTRAVENOUS ONCE
Refills: 0 | Status: COMPLETED | OUTPATIENT
Start: 2025-06-22 | End: 2025-06-22

## 2025-06-22 RX ORDER — HEPARIN SODIUM 1000 [USP'U]/ML
5000 INJECTION INTRAVENOUS; SUBCUTANEOUS EVERY 8 HOURS
Refills: 0 | Status: DISCONTINUED | OUTPATIENT
Start: 2025-06-22 | End: 2025-06-28

## 2025-06-22 RX ORDER — INSULIN LISPRO 100 U/ML
INJECTION, SOLUTION INTRAVENOUS; SUBCUTANEOUS AT BEDTIME
Refills: 0 | Status: DISCONTINUED | OUTPATIENT
Start: 2025-06-22 | End: 2025-06-25

## 2025-06-22 RX ORDER — ACETAMINOPHEN 500 MG/5ML
650 LIQUID (ML) ORAL ONCE
Refills: 0 | Status: DISCONTINUED | OUTPATIENT
Start: 2025-06-22 | End: 2025-06-23

## 2025-06-22 RX ORDER — DEXTROSE 50 % IN WATER 50 %
12.5 SYRINGE (ML) INTRAVENOUS ONCE
Refills: 0 | Status: DISCONTINUED | OUTPATIENT
Start: 2025-06-22 | End: 2025-06-28

## 2025-06-22 RX ORDER — DEXTROSE 50 % IN WATER 50 %
25 SYRINGE (ML) INTRAVENOUS ONCE
Refills: 0 | Status: DISCONTINUED | OUTPATIENT
Start: 2025-06-22 | End: 2025-06-28

## 2025-06-22 RX ORDER — VANCOMYCIN HCL IN 5 % DEXTROSE 1.5G/250ML
750 PLASTIC BAG, INJECTION (ML) INTRAVENOUS EVERY 12 HOURS
Refills: 0 | Status: DISCONTINUED | OUTPATIENT
Start: 2025-06-22 | End: 2025-06-25

## 2025-06-22 RX ORDER — GLUCAGON 3 MG/1
1 POWDER NASAL ONCE
Refills: 0 | Status: DISCONTINUED | OUTPATIENT
Start: 2025-06-22 | End: 2025-06-28

## 2025-06-22 RX ORDER — PIPERACILLIN-TAZO-DEXTROSE,ISO 3.375G/5
3.38 IV SOLUTION, PIGGYBACK PREMIX FROZEN(ML) INTRAVENOUS ONCE
Refills: 0 | Status: COMPLETED | OUTPATIENT
Start: 2025-06-23 | End: 2025-06-23

## 2025-06-22 RX ORDER — PIPERACILLIN-TAZO-DEXTROSE,ISO 3.375G/5
3.38 IV SOLUTION, PIGGYBACK PREMIX FROZEN(ML) INTRAVENOUS ONCE
Refills: 0 | Status: COMPLETED | OUTPATIENT
Start: 2025-06-22 | End: 2025-06-22

## 2025-06-22 RX ORDER — MELATONIN 5 MG
3 TABLET ORAL AT BEDTIME
Refills: 0 | Status: DISCONTINUED | OUTPATIENT
Start: 2025-06-22 | End: 2025-06-28

## 2025-06-22 RX ORDER — PIPERACILLIN-TAZO-DEXTROSE,ISO 3.375G/5
3.38 IV SOLUTION, PIGGYBACK PREMIX FROZEN(ML) INTRAVENOUS EVERY 8 HOURS
Refills: 0 | Status: DISCONTINUED | OUTPATIENT
Start: 2025-06-22 | End: 2025-06-28

## 2025-06-22 RX ORDER — ONDANSETRON HCL/PF 4 MG/2 ML
4 VIAL (ML) INJECTION EVERY 8 HOURS
Refills: 0 | Status: DISCONTINUED | OUTPATIENT
Start: 2025-06-22 | End: 2025-06-28

## 2025-06-22 RX ORDER — DEXTROSE 50 % IN WATER 50 %
15 SYRINGE (ML) INTRAVENOUS ONCE
Refills: 0 | Status: DISCONTINUED | OUTPATIENT
Start: 2025-06-22 | End: 2025-06-28

## 2025-06-22 RX ADMIN — SODIUM CHLORIDE 2300 MILLILITER(S): 9 INJECTION, SOLUTION INTRAVENOUS at 20:36

## 2025-06-22 RX ADMIN — Medication 200 GRAM(S): at 21:00

## 2025-06-22 RX ADMIN — Medication 250 MILLIGRAM(S): at 21:14

## 2025-06-23 LAB
A1C WITH ESTIMATED AVERAGE GLUCOSE RESULT: 8.4 % — HIGH (ref 4–5.6)
ANION GAP SERPL CALC-SCNC: 8 MMOL/L — SIGNIFICANT CHANGE UP (ref 5–17)
BUN SERPL-MCNC: 18 MG/DL — SIGNIFICANT CHANGE UP (ref 7–23)
CALCIUM SERPL-MCNC: 9.6 MG/DL — SIGNIFICANT CHANGE UP (ref 8.5–10.1)
CHLORIDE SERPL-SCNC: 103 MMOL/L — SIGNIFICANT CHANGE UP (ref 96–108)
CO2 SERPL-SCNC: 24 MMOL/L — SIGNIFICANT CHANGE UP (ref 22–31)
CREAT SERPL-MCNC: 0.66 MG/DL — SIGNIFICANT CHANGE UP (ref 0.5–1.3)
EGFR: 105 ML/MIN/1.73M2 — SIGNIFICANT CHANGE UP
EGFR: 105 ML/MIN/1.73M2 — SIGNIFICANT CHANGE UP
ESTIMATED AVERAGE GLUCOSE: 194 MG/DL — HIGH (ref 68–114)
GLUCOSE BLDC GLUCOMTR-MCNC: 137 MG/DL — HIGH (ref 70–99)
GLUCOSE BLDC GLUCOMTR-MCNC: 194 MG/DL — HIGH (ref 70–99)
GLUCOSE BLDC GLUCOMTR-MCNC: 200 MG/DL — HIGH (ref 70–99)
GLUCOSE BLDC GLUCOMTR-MCNC: 208 MG/DL — HIGH (ref 70–99)
GLUCOSE BLDC GLUCOMTR-MCNC: 215 MG/DL — HIGH (ref 70–99)
GLUCOSE SERPL-MCNC: 184 MG/DL — HIGH (ref 70–99)
GRAM STN FLD: ABNORMAL
HCT VFR BLD CALC: 33.6 % — LOW (ref 39–50)
HGB BLD-MCNC: 11.3 G/DL — LOW (ref 13–17)
MCHC RBC-ENTMCNC: 31 PG — SIGNIFICANT CHANGE UP (ref 27–34)
MCHC RBC-ENTMCNC: 33.6 G/DL — SIGNIFICANT CHANGE UP (ref 32–36)
MCV RBC AUTO: 92.1 FL — SIGNIFICANT CHANGE UP (ref 80–100)
NRBC # BLD AUTO: 0 K/UL — SIGNIFICANT CHANGE UP (ref 0–0)
NRBC # FLD: 0 K/UL — SIGNIFICANT CHANGE UP (ref 0–0)
NRBC BLD AUTO-RTO: 0 /100 WBCS — SIGNIFICANT CHANGE UP (ref 0–0)
PLATELET # BLD AUTO: 279 K/UL — SIGNIFICANT CHANGE UP (ref 150–400)
PMV BLD: 8.7 FL — SIGNIFICANT CHANGE UP (ref 7–13)
POTASSIUM SERPL-MCNC: 3.4 MMOL/L — LOW (ref 3.5–5.3)
POTASSIUM SERPL-SCNC: 3.4 MMOL/L — LOW (ref 3.5–5.3)
RBC # BLD: 3.65 M/UL — LOW (ref 4.2–5.8)
RBC # FLD: 13.2 % — SIGNIFICANT CHANGE UP (ref 10.3–14.5)
SODIUM SERPL-SCNC: 135 MMOL/L — SIGNIFICANT CHANGE UP (ref 135–145)
SPECIMEN SOURCE: SIGNIFICANT CHANGE UP
WBC # BLD: 6.2 K/UL — SIGNIFICANT CHANGE UP (ref 3.8–10.5)
WBC # FLD AUTO: 6.2 K/UL — SIGNIFICANT CHANGE UP (ref 3.8–10.5)

## 2025-06-23 PROCEDURE — 73720 MRI LWR EXTREMITY W/O&W/DYE: CPT | Mod: 26,LT,76

## 2025-06-23 PROCEDURE — 93010 ELECTROCARDIOGRAM REPORT: CPT

## 2025-06-23 PROCEDURE — 99222 1ST HOSP IP/OBS MODERATE 55: CPT

## 2025-06-23 PROCEDURE — 71045 X-RAY EXAM CHEST 1 VIEW: CPT | Mod: 26

## 2025-06-23 PROCEDURE — 99233 SBSQ HOSP IP/OBS HIGH 50: CPT

## 2025-06-23 RX ORDER — SODIUM CHLORIDE 9 G/1000ML
1000 INJECTION, SOLUTION INTRAVENOUS
Refills: 0 | Status: DISCONTINUED | OUTPATIENT
Start: 2025-06-23 | End: 2025-06-24

## 2025-06-23 RX ORDER — ACETAMINOPHEN 500 MG/5ML
650 LIQUID (ML) ORAL EVERY 6 HOURS
Refills: 0 | Status: DISCONTINUED | OUTPATIENT
Start: 2025-06-23 | End: 2025-06-28

## 2025-06-23 RX ORDER — INSULIN GLARGINE-YFGN 100 [IU]/ML
5 INJECTION, SOLUTION SUBCUTANEOUS AT BEDTIME
Refills: 0 | Status: DISCONTINUED | OUTPATIENT
Start: 2025-06-23 | End: 2025-06-28

## 2025-06-23 RX ORDER — DOXYCYCLINE HYCLATE 100 MG
100 TABLET ORAL EVERY 12 HOURS
Refills: 0 | Status: DISCONTINUED | OUTPATIENT
Start: 2025-06-23 | End: 2025-06-27

## 2025-06-23 RX ADMIN — Medication 3 MILLIGRAM(S): at 00:32

## 2025-06-23 RX ADMIN — Medication 25 GRAM(S): at 17:10

## 2025-06-23 RX ADMIN — Medication 650 MILLIGRAM(S): at 09:54

## 2025-06-23 RX ADMIN — LISINOPRIL 2.5 MILLIGRAM(S): 5 TABLET ORAL at 09:17

## 2025-06-23 RX ADMIN — Medication 25 GRAM(S): at 00:34

## 2025-06-23 RX ADMIN — INSULIN LISPRO 1: 100 INJECTION, SOLUTION INTRAVENOUS; SUBCUTANEOUS at 07:50

## 2025-06-23 RX ADMIN — ATORVASTATIN CALCIUM 40 MILLIGRAM(S): 80 TABLET, FILM COATED ORAL at 00:32

## 2025-06-23 RX ADMIN — HEPARIN SODIUM 5000 UNIT(S): 1000 INJECTION INTRAVENOUS; SUBCUTANEOUS at 00:32

## 2025-06-23 RX ADMIN — Medication 650 MILLIGRAM(S): at 20:58

## 2025-06-23 RX ADMIN — HEPARIN SODIUM 5000 UNIT(S): 1000 INJECTION INTRAVENOUS; SUBCUTANEOUS at 17:10

## 2025-06-23 RX ADMIN — INSULIN LISPRO 1: 100 INJECTION, SOLUTION INTRAVENOUS; SUBCUTANEOUS at 12:03

## 2025-06-23 RX ADMIN — Medication 650 MILLIGRAM(S): at 20:28

## 2025-06-23 RX ADMIN — Medication 250 MILLIGRAM(S): at 23:21

## 2025-06-23 RX ADMIN — INSULIN GLARGINE-YFGN 5 UNIT(S): 100 INJECTION, SOLUTION SUBCUTANEOUS at 23:21

## 2025-06-23 RX ADMIN — ATORVASTATIN CALCIUM 40 MILLIGRAM(S): 80 TABLET, FILM COATED ORAL at 23:21

## 2025-06-23 RX ADMIN — SODIUM CHLORIDE 100 MILLILITER(S): 9 INJECTION, SOLUTION INTRAVENOUS at 09:54

## 2025-06-23 RX ADMIN — HEPARIN SODIUM 5000 UNIT(S): 1000 INJECTION INTRAVENOUS; SUBCUTANEOUS at 23:20

## 2025-06-23 RX ADMIN — Medication 110 MILLIGRAM(S): at 20:27

## 2025-06-23 RX ADMIN — HEPARIN SODIUM 5000 UNIT(S): 1000 INJECTION INTRAVENOUS; SUBCUTANEOUS at 07:51

## 2025-06-23 RX ADMIN — Medication 25 GRAM(S): at 07:51

## 2025-06-23 RX ADMIN — INSULIN LISPRO 2: 100 INJECTION, SOLUTION INTRAVENOUS; SUBCUTANEOUS at 17:11

## 2025-06-23 RX ADMIN — Medication 40 MILLIEQUIVALENT(S): at 17:10

## 2025-06-23 RX ADMIN — Medication 250 MILLIGRAM(S): at 11:29

## 2025-06-24 LAB
ANION GAP SERPL CALC-SCNC: 6 MMOL/L — SIGNIFICANT CHANGE UP (ref 5–17)
B BURGDOR C6 AB SER-ACNC: NEGATIVE — SIGNIFICANT CHANGE UP
B BURGDOR C6 AB SER-ACNC: NEGATIVE — SIGNIFICANT CHANGE UP
B BURGDOR IGG+IGM SER QL IB: SIGNIFICANT CHANGE UP
B BURGDOR IGG+IGM SER-ACNC: 0.06 INDEX — SIGNIFICANT CHANGE UP (ref 0.01–0.9)
B BURGDOR IGG+IGM SER-ACNC: 0.07 INDEX — SIGNIFICANT CHANGE UP (ref 0.01–0.9)
B MICROTI DNA BLD QL NAA+PROBE: SIGNIFICANT CHANGE UP
BABESIA 18S RRNA BLD QL NAA+PROBE: SIGNIFICANT CHANGE UP
BUN SERPL-MCNC: 12 MG/DL — SIGNIFICANT CHANGE UP (ref 7–23)
CALCIUM SERPL-MCNC: 8.8 MG/DL — SIGNIFICANT CHANGE UP (ref 8.5–10.1)
CHLORIDE SERPL-SCNC: 105 MMOL/L — SIGNIFICANT CHANGE UP (ref 96–108)
CHOLEST SERPL-MCNC: 160 MG/DL — SIGNIFICANT CHANGE UP
CO2 SERPL-SCNC: 23 MMOL/L — SIGNIFICANT CHANGE UP (ref 22–31)
CREAT SERPL-MCNC: 0.59 MG/DL — SIGNIFICANT CHANGE UP (ref 0.5–1.3)
EGFR: 109 ML/MIN/1.73M2 — SIGNIFICANT CHANGE UP
EGFR: 109 ML/MIN/1.73M2 — SIGNIFICANT CHANGE UP
GLUCOSE BLDC GLUCOMTR-MCNC: 183 MG/DL — HIGH (ref 70–99)
GLUCOSE BLDC GLUCOMTR-MCNC: 240 MG/DL — HIGH (ref 70–99)
GLUCOSE BLDC GLUCOMTR-MCNC: 245 MG/DL — HIGH (ref 70–99)
GLUCOSE BLDC GLUCOMTR-MCNC: 268 MG/DL — HIGH (ref 70–99)
GLUCOSE SERPL-MCNC: 163 MG/DL — HIGH (ref 70–99)
HCT VFR BLD CALC: 32.7 % — LOW (ref 39–50)
HDLC SERPL-MCNC: 38 MG/DL — LOW
HGB BLD-MCNC: 10.8 G/DL — LOW (ref 13–17)
LDLC SERPL-MCNC: 96 MG/DL — SIGNIFICANT CHANGE UP
LIPID PNL WITH DIRECT LDL SERPL: 96 MG/DL — SIGNIFICANT CHANGE UP
MCHC RBC-ENTMCNC: 30.9 PG — SIGNIFICANT CHANGE UP (ref 27–34)
MCHC RBC-ENTMCNC: 33 G/DL — SIGNIFICANT CHANGE UP (ref 32–36)
MCV RBC AUTO: 93.4 FL — SIGNIFICANT CHANGE UP (ref 80–100)
NONHDLC SERPL-MCNC: 122 MG/DL — SIGNIFICANT CHANGE UP
NRBC # BLD AUTO: 0 K/UL — SIGNIFICANT CHANGE UP (ref 0–0)
NRBC # FLD: 0 K/UL — SIGNIFICANT CHANGE UP (ref 0–0)
NRBC BLD AUTO-RTO: 0 /100 WBCS — SIGNIFICANT CHANGE UP (ref 0–0)
PLATELET # BLD AUTO: 281 K/UL — SIGNIFICANT CHANGE UP (ref 150–400)
PMV BLD: 9 FL — SIGNIFICANT CHANGE UP (ref 7–13)
POTASSIUM SERPL-MCNC: 3.8 MMOL/L — SIGNIFICANT CHANGE UP (ref 3.5–5.3)
POTASSIUM SERPL-SCNC: 3.8 MMOL/L — SIGNIFICANT CHANGE UP (ref 3.5–5.3)
RBC # BLD: 3.5 M/UL — LOW (ref 4.2–5.8)
RBC # FLD: 13.1 % — SIGNIFICANT CHANGE UP (ref 10.3–14.5)
SODIUM SERPL-SCNC: 134 MMOL/L — LOW (ref 135–145)
TRIGL SERPL-MCNC: 151 MG/DL — HIGH
VANCOMYCIN TROUGH SERPL-MCNC: 3.7 UG/ML — LOW (ref 10–20)
WBC # BLD: 7.39 K/UL — SIGNIFICANT CHANGE UP (ref 3.8–10.5)
WBC # FLD AUTO: 7.39 K/UL — SIGNIFICANT CHANGE UP (ref 3.8–10.5)

## 2025-06-24 PROCEDURE — 93923 UPR/LXTR ART STDY 3+ LVLS: CPT | Mod: 26

## 2025-06-24 PROCEDURE — 99233 SBSQ HOSP IP/OBS HIGH 50: CPT | Mod: 57

## 2025-06-24 PROCEDURE — 99233 SBSQ HOSP IP/OBS HIGH 50: CPT

## 2025-06-24 RX ADMIN — Medication 250 MILLIGRAM(S): at 09:50

## 2025-06-24 RX ADMIN — Medication 110 MILLIGRAM(S): at 11:36

## 2025-06-24 RX ADMIN — HEPARIN SODIUM 5000 UNIT(S): 1000 INJECTION INTRAVENOUS; SUBCUTANEOUS at 22:22

## 2025-06-24 RX ADMIN — Medication 250 MILLIGRAM(S): at 22:58

## 2025-06-24 RX ADMIN — INSULIN LISPRO 1: 100 INJECTION, SOLUTION INTRAVENOUS; SUBCUTANEOUS at 08:33

## 2025-06-24 RX ADMIN — Medication 650 MILLIGRAM(S): at 17:31

## 2025-06-24 RX ADMIN — ATORVASTATIN CALCIUM 40 MILLIGRAM(S): 80 TABLET, FILM COATED ORAL at 22:22

## 2025-06-24 RX ADMIN — LISINOPRIL 2.5 MILLIGRAM(S): 5 TABLET ORAL at 09:50

## 2025-06-24 RX ADMIN — SODIUM CHLORIDE 100 MILLILITER(S): 9 INJECTION, SOLUTION INTRAVENOUS at 00:54

## 2025-06-24 RX ADMIN — HEPARIN SODIUM 5000 UNIT(S): 1000 INJECTION INTRAVENOUS; SUBCUTANEOUS at 14:49

## 2025-06-24 RX ADMIN — Medication 25 GRAM(S): at 09:48

## 2025-06-24 RX ADMIN — INSULIN GLARGINE-YFGN 5 UNIT(S): 100 INJECTION, SOLUTION SUBCUTANEOUS at 22:18

## 2025-06-24 RX ADMIN — INSULIN LISPRO 2: 100 INJECTION, SOLUTION INTRAVENOUS; SUBCUTANEOUS at 12:12

## 2025-06-24 RX ADMIN — INSULIN LISPRO 2: 100 INJECTION, SOLUTION INTRAVENOUS; SUBCUTANEOUS at 17:36

## 2025-06-24 RX ADMIN — Medication 25 GRAM(S): at 01:34

## 2025-06-24 RX ADMIN — Medication 110 MILLIGRAM(S): at 22:27

## 2025-06-24 RX ADMIN — HEPARIN SODIUM 5000 UNIT(S): 1000 INJECTION INTRAVENOUS; SUBCUTANEOUS at 05:29

## 2025-06-24 RX ADMIN — Medication 25 GRAM(S): at 16:33

## 2025-06-24 RX ADMIN — INSULIN LISPRO 1: 100 INJECTION, SOLUTION INTRAVENOUS; SUBCUTANEOUS at 22:24

## 2025-06-25 ENCOUNTER — RESULT REVIEW (OUTPATIENT)
Age: 65
End: 2025-06-25

## 2025-06-25 ENCOUNTER — APPOINTMENT (OUTPATIENT)
Facility: HOSPITAL | Age: 65
End: 2025-06-25

## 2025-06-25 LAB
-  AMOXICILLIN/CLAVULANIC ACID: SIGNIFICANT CHANGE UP
-  AMPICILLIN/SULBACTAM: SIGNIFICANT CHANGE UP
-  AMPICILLIN: SIGNIFICANT CHANGE UP
-  AZTREONAM: SIGNIFICANT CHANGE UP
-  CEFAZOLIN: SIGNIFICANT CHANGE UP
-  CEFEPIME: SIGNIFICANT CHANGE UP
-  CEFOXITIN: SIGNIFICANT CHANGE UP
-  CEFTRIAXONE: SIGNIFICANT CHANGE UP
-  CIPROFLOXACIN: SIGNIFICANT CHANGE UP
-  CLINDAMYCIN: SIGNIFICANT CHANGE UP
-  CLINDAMYCIN: SIGNIFICANT CHANGE UP
-  ERTAPENEM: SIGNIFICANT CHANGE UP
-  ERYTHROMYCIN: SIGNIFICANT CHANGE UP
-  ERYTHROMYCIN: SIGNIFICANT CHANGE UP
-  GENTAMICIN: SIGNIFICANT CHANGE UP
-  IMIPENEM: SIGNIFICANT CHANGE UP
-  LEVOFLOXACIN: SIGNIFICANT CHANGE UP
-  MEROPENEM: SIGNIFICANT CHANGE UP
-  OXACILLIN: SIGNIFICANT CHANGE UP
-  OXACILLIN: SIGNIFICANT CHANGE UP
-  PENICILLIN: SIGNIFICANT CHANGE UP
-  PENICILLIN: SIGNIFICANT CHANGE UP
-  PIPERACILLIN/TAZOBACTAM: SIGNIFICANT CHANGE UP
-  RIFAMPIN: SIGNIFICANT CHANGE UP
-  RIFAMPIN: SIGNIFICANT CHANGE UP
-  TETRACYCLINE: SIGNIFICANT CHANGE UP
-  TETRACYCLINE: SIGNIFICANT CHANGE UP
-  TIGECYCLINE: SIGNIFICANT CHANGE UP
-  TOBRAMYCIN: SIGNIFICANT CHANGE UP
-  TRIMETHOPRIM/SULFAMETHOXAZOLE: SIGNIFICANT CHANGE UP
-  VANCOMYCIN: SIGNIFICANT CHANGE UP
-  VANCOMYCIN: SIGNIFICANT CHANGE UP
ANION GAP SERPL CALC-SCNC: 7 MMOL/L — SIGNIFICANT CHANGE UP (ref 5–17)
BLD GP AB SCN SERPL QL: SIGNIFICANT CHANGE UP
BUN SERPL-MCNC: 11 MG/DL — SIGNIFICANT CHANGE UP (ref 7–23)
CALCIUM SERPL-MCNC: 8.7 MG/DL — SIGNIFICANT CHANGE UP (ref 8.5–10.1)
CHLORIDE SERPL-SCNC: 107 MMOL/L — SIGNIFICANT CHANGE UP (ref 96–108)
CO2 SERPL-SCNC: 21 MMOL/L — LOW (ref 22–31)
CREAT SERPL-MCNC: 0.63 MG/DL — SIGNIFICANT CHANGE UP (ref 0.5–1.3)
CULTURE RESULTS: ABNORMAL
EGFR: 107 ML/MIN/1.73M2 — SIGNIFICANT CHANGE UP
EGFR: 107 ML/MIN/1.73M2 — SIGNIFICANT CHANGE UP
GLUCOSE BLDC GLUCOMTR-MCNC: 167 MG/DL — HIGH (ref 70–99)
GLUCOSE BLDC GLUCOMTR-MCNC: 244 MG/DL — HIGH (ref 70–99)
GLUCOSE BLDC GLUCOMTR-MCNC: 255 MG/DL — HIGH (ref 70–99)
GLUCOSE SERPL-MCNC: 202 MG/DL — HIGH (ref 70–99)
GRAM STN FLD: ABNORMAL
HCT VFR BLD CALC: 29.4 % — LOW (ref 39–50)
HGB BLD-MCNC: 9.9 G/DL — LOW (ref 13–17)
INR BLD: 1.23 RATIO — HIGH (ref 0.85–1.16)
MCHC RBC-ENTMCNC: 30.7 PG — SIGNIFICANT CHANGE UP (ref 27–34)
MCHC RBC-ENTMCNC: 33.7 G/DL — SIGNIFICANT CHANGE UP (ref 32–36)
MCV RBC AUTO: 91.3 FL — SIGNIFICANT CHANGE UP (ref 80–100)
METHOD TYPE: SIGNIFICANT CHANGE UP
MSSA DNA SPEC QL NAA+PROBE: SIGNIFICANT CHANGE UP
NRBC # BLD AUTO: 0 K/UL — SIGNIFICANT CHANGE UP (ref 0–0)
NRBC # FLD: 0 K/UL — SIGNIFICANT CHANGE UP (ref 0–0)
NRBC BLD AUTO-RTO: 0 /100 WBCS — SIGNIFICANT CHANGE UP (ref 0–0)
ORGANISM # SPEC MICROSCOPIC CNT: ABNORMAL
ORGANISM # SPEC MICROSCOPIC CNT: SIGNIFICANT CHANGE UP
PLATELET # BLD AUTO: 294 K/UL — SIGNIFICANT CHANGE UP (ref 150–400)
PMV BLD: 9.3 FL — SIGNIFICANT CHANGE UP (ref 7–13)
POTASSIUM SERPL-MCNC: 3.6 MMOL/L — SIGNIFICANT CHANGE UP (ref 3.5–5.3)
POTASSIUM SERPL-SCNC: 3.6 MMOL/L — SIGNIFICANT CHANGE UP (ref 3.5–5.3)
PROTHROM AB SERPL-ACNC: 14.1 SEC — HIGH (ref 9.9–13.4)
RBC # BLD: 3.22 M/UL — LOW (ref 4.2–5.8)
RBC # FLD: 12.8 % — SIGNIFICANT CHANGE UP (ref 10.3–14.5)
SODIUM SERPL-SCNC: 135 MMOL/L — SIGNIFICANT CHANGE UP (ref 135–145)
SPECIMEN SOURCE: SIGNIFICANT CHANGE UP
WBC # BLD: 7.04 K/UL — SIGNIFICANT CHANGE UP (ref 3.8–10.5)
WBC # FLD AUTO: 7.04 K/UL — SIGNIFICANT CHANGE UP (ref 3.8–10.5)

## 2025-06-25 PROCEDURE — 88304 TISSUE EXAM BY PATHOLOGIST: CPT | Mod: 26

## 2025-06-25 PROCEDURE — 88311 DECALCIFY TISSUE: CPT | Mod: 26

## 2025-06-25 PROCEDURE — 20225 BONE BIOPSY TROCAR/NDL DEEP: CPT | Mod: LT,59

## 2025-06-25 PROCEDURE — 88305 TISSUE EXAM BY PATHOLOGIST: CPT | Mod: 26

## 2025-06-25 PROCEDURE — 11042 DBRDMT SUBQ TIS 1ST 20SQCM/<: CPT | Mod: LT,59

## 2025-06-25 PROCEDURE — 99253 IP/OBS CNSLTJ NEW/EST LOW 45: CPT

## 2025-06-25 PROCEDURE — 93925 LOWER EXTREMITY STUDY: CPT | Mod: 26

## 2025-06-25 PROCEDURE — 99232 SBSQ HOSP IP/OBS MODERATE 35: CPT

## 2025-06-25 PROCEDURE — 28820 AMPUTATION OF TOE: CPT | Mod: 59,T9

## 2025-06-25 PROCEDURE — 73630 X-RAY EXAM OF FOOT: CPT | Mod: 26,50

## 2025-06-25 RX ORDER — INSULIN LISPRO 100 U/ML
INJECTION, SOLUTION INTRAVENOUS; SUBCUTANEOUS
Refills: 0 | Status: DISCONTINUED | OUTPATIENT
Start: 2025-06-25 | End: 2025-06-28

## 2025-06-25 RX ORDER — HYDROMORPHONE/SOD CHLOR,ISO/PF 2 MG/10 ML
0.5 SYRINGE (ML) INJECTION
Refills: 0 | Status: DISCONTINUED | OUTPATIENT
Start: 2025-06-25 | End: 2025-06-25

## 2025-06-25 RX ORDER — INSULIN LISPRO 100 U/ML
INJECTION, SOLUTION INTRAVENOUS; SUBCUTANEOUS AT BEDTIME
Refills: 0 | Status: DISCONTINUED | OUTPATIENT
Start: 2025-06-25 | End: 2025-06-28

## 2025-06-25 RX ORDER — ONDANSETRON HCL/PF 4 MG/2 ML
4 VIAL (ML) INJECTION ONCE
Refills: 0 | Status: DISCONTINUED | OUTPATIENT
Start: 2025-06-25 | End: 2025-06-25

## 2025-06-25 RX ORDER — SODIUM CHLORIDE 9 G/1000ML
1000 INJECTION, SOLUTION INTRAVENOUS
Refills: 0 | Status: DISCONTINUED | OUTPATIENT
Start: 2025-06-25 | End: 2025-06-25

## 2025-06-25 RX ADMIN — INSULIN LISPRO 2: 100 INJECTION, SOLUTION INTRAVENOUS; SUBCUTANEOUS at 08:00

## 2025-06-25 RX ADMIN — Medication 25 GRAM(S): at 01:00

## 2025-06-25 RX ADMIN — INSULIN LISPRO 2: 100 INJECTION, SOLUTION INTRAVENOUS; SUBCUTANEOUS at 21:53

## 2025-06-25 RX ADMIN — Medication 25 GRAM(S): at 11:15

## 2025-06-25 RX ADMIN — Medication 25 GRAM(S): at 19:50

## 2025-06-25 RX ADMIN — Medication 110 MILLIGRAM(S): at 21:55

## 2025-06-25 RX ADMIN — HEPARIN SODIUM 5000 UNIT(S): 1000 INJECTION INTRAVENOUS; SUBCUTANEOUS at 05:17

## 2025-06-25 RX ADMIN — HEPARIN SODIUM 5000 UNIT(S): 1000 INJECTION INTRAVENOUS; SUBCUTANEOUS at 21:54

## 2025-06-25 RX ADMIN — INSULIN LISPRO 1: 100 INJECTION, SOLUTION INTRAVENOUS; SUBCUTANEOUS at 12:57

## 2025-06-25 RX ADMIN — Medication 100 MILLILITER(S): at 12:57

## 2025-06-25 RX ADMIN — ATORVASTATIN CALCIUM 40 MILLIGRAM(S): 80 TABLET, FILM COATED ORAL at 21:55

## 2025-06-25 RX ADMIN — Medication 100 MILLILITER(S): at 09:49

## 2025-06-25 RX ADMIN — INSULIN GLARGINE-YFGN 5 UNIT(S): 100 INJECTION, SOLUTION SUBCUTANEOUS at 21:55

## 2025-06-25 RX ADMIN — Medication 110 MILLIGRAM(S): at 09:48

## 2025-06-26 ENCOUNTER — RESULT REVIEW (OUTPATIENT)
Age: 65
End: 2025-06-26

## 2025-06-26 LAB
A PHAGOCYTOPH DNA BLD QL NAA+PROBE: NEGATIVE — SIGNIFICANT CHANGE UP
ANION GAP SERPL CALC-SCNC: 5 MMOL/L — SIGNIFICANT CHANGE UP (ref 5–17)
B BURGDOR DNA SPEC QL NAA+PROBE: NEGATIVE — SIGNIFICANT CHANGE UP
BUN SERPL-MCNC: 9 MG/DL — SIGNIFICANT CHANGE UP (ref 7–23)
CALCIUM SERPL-MCNC: 9 MG/DL — SIGNIFICANT CHANGE UP (ref 8.5–10.1)
CHLORIDE SERPL-SCNC: 107 MMOL/L — SIGNIFICANT CHANGE UP (ref 96–108)
CO2 SERPL-SCNC: 24 MMOL/L — SIGNIFICANT CHANGE UP (ref 22–31)
CREAT SERPL-MCNC: 0.58 MG/DL — SIGNIFICANT CHANGE UP (ref 0.5–1.3)
E CHAFFEENSIS DNA BLD QL NAA+PROBE: NEGATIVE — SIGNIFICANT CHANGE UP
E EWINGII DNA SPEC QL NAA+PROBE: NEGATIVE — SIGNIFICANT CHANGE UP
EGFR: 110 ML/MIN/1.73M2 — SIGNIFICANT CHANGE UP
EGFR: 110 ML/MIN/1.73M2 — SIGNIFICANT CHANGE UP
EHRLICHIA DNA SPEC QL NAA+PROBE: NEGATIVE — SIGNIFICANT CHANGE UP
GLUCOSE BLDC GLUCOMTR-MCNC: 138 MG/DL — HIGH (ref 70–99)
GLUCOSE BLDC GLUCOMTR-MCNC: 234 MG/DL — HIGH (ref 70–99)
GLUCOSE BLDC GLUCOMTR-MCNC: 275 MG/DL — HIGH (ref 70–99)
GLUCOSE BLDC GLUCOMTR-MCNC: 92 MG/DL — SIGNIFICANT CHANGE UP (ref 70–99)
GLUCOSE SERPL-MCNC: 140 MG/DL — HIGH (ref 70–99)
GRAM STN FLD: ABNORMAL
GRAM STN FLD: SIGNIFICANT CHANGE UP
HCT VFR BLD CALC: 31.9 % — LOW (ref 39–50)
HGB BLD-MCNC: 10.5 G/DL — LOW (ref 13–17)
MCHC RBC-ENTMCNC: 30.1 PG — SIGNIFICANT CHANGE UP (ref 27–34)
MCHC RBC-ENTMCNC: 32.9 G/DL — SIGNIFICANT CHANGE UP (ref 32–36)
MCV RBC AUTO: 91.4 FL — SIGNIFICANT CHANGE UP (ref 80–100)
NRBC # BLD AUTO: 0 K/UL — SIGNIFICANT CHANGE UP (ref 0–0)
NRBC # FLD: 0 K/UL — SIGNIFICANT CHANGE UP (ref 0–0)
NRBC BLD AUTO-RTO: 0 /100 WBCS — SIGNIFICANT CHANGE UP (ref 0–0)
PLATELET # BLD AUTO: 351 K/UL — SIGNIFICANT CHANGE UP (ref 150–400)
PMV BLD: 9 FL — SIGNIFICANT CHANGE UP (ref 7–13)
POTASSIUM SERPL-MCNC: 3.6 MMOL/L — SIGNIFICANT CHANGE UP (ref 3.5–5.3)
POTASSIUM SERPL-SCNC: 3.6 MMOL/L — SIGNIFICANT CHANGE UP (ref 3.5–5.3)
RBC # BLD: 3.49 M/UL — LOW (ref 4.2–5.8)
RBC # FLD: 12.8 % — SIGNIFICANT CHANGE UP (ref 10.3–14.5)
SODIUM SERPL-SCNC: 136 MMOL/L — SIGNIFICANT CHANGE UP (ref 135–145)
SPECIMEN SOURCE: SIGNIFICANT CHANGE UP
SPECIMEN SOURCE: SIGNIFICANT CHANGE UP
WBC # BLD: 6.24 K/UL — SIGNIFICANT CHANGE UP (ref 3.8–10.5)
WBC # FLD AUTO: 6.24 K/UL — SIGNIFICANT CHANGE UP (ref 3.8–10.5)

## 2025-06-26 PROCEDURE — 99233 SBSQ HOSP IP/OBS HIGH 50: CPT

## 2025-06-26 PROCEDURE — 93306 TTE W/DOPPLER COMPLETE: CPT | Mod: 26

## 2025-06-26 RX ORDER — QUETIAPINE FUMARATE 25 MG/1
12.5 TABLET ORAL AT BEDTIME
Refills: 0 | Status: DISCONTINUED | OUTPATIENT
Start: 2025-06-26 | End: 2025-06-26

## 2025-06-26 RX ADMIN — Medication 100 MILLILITER(S): at 05:03

## 2025-06-26 RX ADMIN — Medication 25 GRAM(S): at 09:50

## 2025-06-26 RX ADMIN — Medication 650 MILLIGRAM(S): at 05:43

## 2025-06-26 RX ADMIN — HEPARIN SODIUM 5000 UNIT(S): 1000 INJECTION INTRAVENOUS; SUBCUTANEOUS at 05:11

## 2025-06-26 RX ADMIN — Medication 650 MILLIGRAM(S): at 05:13

## 2025-06-26 RX ADMIN — ATORVASTATIN CALCIUM 40 MILLIGRAM(S): 80 TABLET, FILM COATED ORAL at 21:16

## 2025-06-26 RX ADMIN — Medication 25 GRAM(S): at 02:05

## 2025-06-26 RX ADMIN — LISINOPRIL 2.5 MILLIGRAM(S): 5 TABLET ORAL at 09:50

## 2025-06-26 RX ADMIN — Medication 100 MILLILITER(S): at 17:57

## 2025-06-26 RX ADMIN — Medication 110 MILLIGRAM(S): at 09:50

## 2025-06-26 RX ADMIN — HEPARIN SODIUM 5000 UNIT(S): 1000 INJECTION INTRAVENOUS; SUBCUTANEOUS at 13:12

## 2025-06-26 RX ADMIN — Medication 25 GRAM(S): at 17:57

## 2025-06-26 RX ADMIN — Medication 110 MILLIGRAM(S): at 21:18

## 2025-06-26 RX ADMIN — INSULIN LISPRO 6: 100 INJECTION, SOLUTION INTRAVENOUS; SUBCUTANEOUS at 11:42

## 2025-06-26 RX ADMIN — HEPARIN SODIUM 5000 UNIT(S): 1000 INJECTION INTRAVENOUS; SUBCUTANEOUS at 21:16

## 2025-06-26 RX ADMIN — INSULIN GLARGINE-YFGN 5 UNIT(S): 100 INJECTION, SOLUTION SUBCUTANEOUS at 21:16

## 2025-06-27 LAB
-  CLINDAMYCIN: SIGNIFICANT CHANGE UP
-  ERYTHROMYCIN: SIGNIFICANT CHANGE UP
-  GENTAMICIN: SIGNIFICANT CHANGE UP
-  OXACILLIN: SIGNIFICANT CHANGE UP
-  PENICILLIN: SIGNIFICANT CHANGE UP
-  RIFAMPIN: SIGNIFICANT CHANGE UP
-  TETRACYCLINE: SIGNIFICANT CHANGE UP
-  TRIMETHOPRIM/SULFAMETHOXAZOLE: SIGNIFICANT CHANGE UP
-  VANCOMYCIN: SIGNIFICANT CHANGE UP
ANION GAP SERPL CALC-SCNC: 10 MMOL/L — SIGNIFICANT CHANGE UP (ref 5–17)
BUN SERPL-MCNC: 12 MG/DL — SIGNIFICANT CHANGE UP (ref 7–23)
CALCIUM SERPL-MCNC: 8.8 MG/DL — SIGNIFICANT CHANGE UP (ref 8.5–10.1)
CHLORIDE SERPL-SCNC: 104 MMOL/L — SIGNIFICANT CHANGE UP (ref 96–108)
CO2 SERPL-SCNC: 22 MMOL/L — SIGNIFICANT CHANGE UP (ref 22–31)
CREAT SERPL-MCNC: 0.62 MG/DL — SIGNIFICANT CHANGE UP (ref 0.5–1.3)
CULTURE RESULTS: ABNORMAL
EGFR: 107 ML/MIN/1.73M2 — SIGNIFICANT CHANGE UP
EGFR: 107 ML/MIN/1.73M2 — SIGNIFICANT CHANGE UP
GLUCOSE BLDC GLUCOMTR-MCNC: 156 MG/DL — HIGH (ref 70–99)
GLUCOSE BLDC GLUCOMTR-MCNC: 160 MG/DL — HIGH (ref 70–99)
GLUCOSE BLDC GLUCOMTR-MCNC: 213 MG/DL — HIGH (ref 70–99)
GLUCOSE BLDC GLUCOMTR-MCNC: 237 MG/DL — HIGH (ref 70–99)
GLUCOSE SERPL-MCNC: 173 MG/DL — HIGH (ref 70–99)
HCT VFR BLD CALC: 31.5 % — LOW (ref 39–50)
HGB BLD-MCNC: 10.5 G/DL — LOW (ref 13–17)
MCHC RBC-ENTMCNC: 30.1 PG — SIGNIFICANT CHANGE UP (ref 27–34)
MCHC RBC-ENTMCNC: 33.3 G/DL — SIGNIFICANT CHANGE UP (ref 32–36)
MCV RBC AUTO: 90.3 FL — SIGNIFICANT CHANGE UP (ref 80–100)
METHOD TYPE: SIGNIFICANT CHANGE UP
NRBC # BLD AUTO: 0 K/UL — SIGNIFICANT CHANGE UP (ref 0–0)
NRBC # FLD: 0 K/UL — SIGNIFICANT CHANGE UP (ref 0–0)
NRBC BLD AUTO-RTO: 0 /100 WBCS — SIGNIFICANT CHANGE UP (ref 0–0)
ORGANISM # SPEC MICROSCOPIC CNT: ABNORMAL
ORGANISM # SPEC MICROSCOPIC CNT: ABNORMAL
ORGANISM # SPEC MICROSCOPIC CNT: SIGNIFICANT CHANGE UP
PLATELET # BLD AUTO: 387 K/UL — SIGNIFICANT CHANGE UP (ref 150–400)
PMV BLD: 9 FL — SIGNIFICANT CHANGE UP (ref 7–13)
POTASSIUM SERPL-MCNC: 3.4 MMOL/L — LOW (ref 3.5–5.3)
POTASSIUM SERPL-SCNC: 3.4 MMOL/L — LOW (ref 3.5–5.3)
RBC # BLD: 3.49 M/UL — LOW (ref 4.2–5.8)
RBC # FLD: 12.7 % — SIGNIFICANT CHANGE UP (ref 10.3–14.5)
SODIUM SERPL-SCNC: 136 MMOL/L — SIGNIFICANT CHANGE UP (ref 135–145)
SPECIMEN SOURCE: SIGNIFICANT CHANGE UP
WBC # BLD: 6.22 K/UL — SIGNIFICANT CHANGE UP (ref 3.8–10.5)
WBC # FLD AUTO: 6.22 K/UL — SIGNIFICANT CHANGE UP (ref 3.8–10.5)

## 2025-06-27 PROCEDURE — 99232 SBSQ HOSP IP/OBS MODERATE 35: CPT

## 2025-06-27 RX ORDER — CLOPIDOGREL BISULFATE 75 MG/1
75 TABLET, FILM COATED ORAL DAILY
Refills: 0 | Status: DISCONTINUED | OUTPATIENT
Start: 2025-06-27 | End: 2025-06-28

## 2025-06-27 RX ADMIN — LISINOPRIL 2.5 MILLIGRAM(S): 5 TABLET ORAL at 09:07

## 2025-06-27 RX ADMIN — Medication 650 MILLIGRAM(S): at 08:10

## 2025-06-27 RX ADMIN — INSULIN LISPRO 4: 100 INJECTION, SOLUTION INTRAVENOUS; SUBCUTANEOUS at 11:58

## 2025-06-27 RX ADMIN — Medication 100 MILLILITER(S): at 18:56

## 2025-06-27 RX ADMIN — Medication 30 MILLILITER(S): at 18:57

## 2025-06-27 RX ADMIN — HEPARIN SODIUM 5000 UNIT(S): 1000 INJECTION INTRAVENOUS; SUBCUTANEOUS at 22:01

## 2025-06-27 RX ADMIN — Medication 25 GRAM(S): at 18:43

## 2025-06-27 RX ADMIN — HEPARIN SODIUM 5000 UNIT(S): 1000 INJECTION INTRAVENOUS; SUBCUTANEOUS at 14:04

## 2025-06-27 RX ADMIN — INSULIN LISPRO 2: 100 INJECTION, SOLUTION INTRAVENOUS; SUBCUTANEOUS at 08:18

## 2025-06-27 RX ADMIN — Medication 650 MILLIGRAM(S): at 14:05

## 2025-06-27 RX ADMIN — Medication 110 MILLIGRAM(S): at 08:08

## 2025-06-27 RX ADMIN — INSULIN GLARGINE-YFGN 5 UNIT(S): 100 INJECTION, SOLUTION SUBCUTANEOUS at 22:00

## 2025-06-27 RX ADMIN — Medication 650 MILLIGRAM(S): at 09:00

## 2025-06-27 RX ADMIN — Medication 25 GRAM(S): at 00:41

## 2025-06-27 RX ADMIN — Medication 100 MILLILITER(S): at 09:42

## 2025-06-27 RX ADMIN — Medication 25 GRAM(S): at 09:07

## 2025-06-27 RX ADMIN — ATORVASTATIN CALCIUM 40 MILLIGRAM(S): 80 TABLET, FILM COATED ORAL at 22:01

## 2025-06-27 RX ADMIN — Medication 40 MILLIEQUIVALENT(S): at 11:59

## 2025-06-27 RX ADMIN — Medication 650 MILLIGRAM(S): at 15:45

## 2025-06-28 ENCOUNTER — TRANSCRIPTION ENCOUNTER (OUTPATIENT)
Age: 65
End: 2025-06-28

## 2025-06-28 VITALS
RESPIRATION RATE: 18 BRPM | TEMPERATURE: 97 F | OXYGEN SATURATION: 97 % | HEART RATE: 97 BPM | DIASTOLIC BLOOD PRESSURE: 82 MMHG | SYSTOLIC BLOOD PRESSURE: 132 MMHG

## 2025-06-28 LAB
-  AMOXICILLIN/CLAVULANIC ACID: SIGNIFICANT CHANGE UP
-  AMPICILLIN/SULBACTAM: SIGNIFICANT CHANGE UP
-  AMPICILLIN: SIGNIFICANT CHANGE UP
-  AMPICILLIN: SIGNIFICANT CHANGE UP
-  AZTREONAM: SIGNIFICANT CHANGE UP
-  CEFAZOLIN: SIGNIFICANT CHANGE UP
-  CEFEPIME: SIGNIFICANT CHANGE UP
-  CEFOTAXIME: SIGNIFICANT CHANGE UP
-  CEFOXITIN: SIGNIFICANT CHANGE UP
-  CEFTAZIDIME: SIGNIFICANT CHANGE UP
-  CEFTRIAXONE: SIGNIFICANT CHANGE UP
-  CEFUROXIME: SIGNIFICANT CHANGE UP
-  CIPROFLOXACIN: SIGNIFICANT CHANGE UP
-  ERTAPENEM: SIGNIFICANT CHANGE UP
-  GENTAMICIN: SIGNIFICANT CHANGE UP
-  IMIPENEM: SIGNIFICANT CHANGE UP
-  LEVOFLOXACIN: SIGNIFICANT CHANGE UP
-  MEROPENEM: SIGNIFICANT CHANGE UP
-  MINOCYCLINE: SIGNIFICANT CHANGE UP
-  PIPERACILLIN/TAZOBACTAM: SIGNIFICANT CHANGE UP
-  TIGECYCLINE: SIGNIFICANT CHANGE UP
-  TOBRAMYCIN: SIGNIFICANT CHANGE UP
-  TRIMETHOPRIM/SULFAMETHOXAZOLE: SIGNIFICANT CHANGE UP
-  VANCOMYCIN: SIGNIFICANT CHANGE UP
A PHAGOCYTOPH IGG TITR SER IF: SIGNIFICANT CHANGE UP
ANION GAP SERPL CALC-SCNC: 5 MMOL/L — SIGNIFICANT CHANGE UP (ref 5–17)
B BURGDOR AB SER QL IA: 0.22 IV — SIGNIFICANT CHANGE UP
B MICROTI IGG TITR SER: SIGNIFICANT CHANGE UP
BUN SERPL-MCNC: 12 MG/DL — SIGNIFICANT CHANGE UP (ref 7–23)
CALCIUM SERPL-MCNC: 8.8 MG/DL — SIGNIFICANT CHANGE UP (ref 8.5–10.1)
CHLORIDE SERPL-SCNC: 109 MMOL/L — HIGH (ref 96–108)
CO2 SERPL-SCNC: 23 MMOL/L — SIGNIFICANT CHANGE UP (ref 22–31)
CREAT SERPL-MCNC: 0.6 MG/DL — SIGNIFICANT CHANGE UP (ref 0.5–1.3)
CRP SERPL-MCNC: 99 MG/L — HIGH (ref 0–5)
CULTURE RESULTS: SIGNIFICANT CHANGE UP
E CHAFFEENSIS IGG TITR SER IF: SIGNIFICANT CHANGE UP
EGFR: 108 ML/MIN/1.73M2 — SIGNIFICANT CHANGE UP
EGFR: 108 ML/MIN/1.73M2 — SIGNIFICANT CHANGE UP
ERYTHROCYTE [SEDIMENTATION RATE] IN BLOOD: 110 MM/HR — HIGH (ref 0–15)
GLUCOSE BLDC GLUCOMTR-MCNC: 168 MG/DL — HIGH (ref 70–99)
GLUCOSE BLDC GLUCOMTR-MCNC: 206 MG/DL — HIGH (ref 70–99)
GLUCOSE BLDC GLUCOMTR-MCNC: 220 MG/DL — HIGH (ref 70–99)
GLUCOSE SERPL-MCNC: 200 MG/DL — HIGH (ref 70–99)
MAGNESIUM SERPL-MCNC: 1.8 MG/DL — SIGNIFICANT CHANGE UP (ref 1.6–2.6)
METHOD TYPE: SIGNIFICANT CHANGE UP
METHOD TYPE: SIGNIFICANT CHANGE UP
POTASSIUM SERPL-MCNC: 3.8 MMOL/L — SIGNIFICANT CHANGE UP (ref 3.5–5.3)
POTASSIUM SERPL-SCNC: 3.8 MMOL/L — SIGNIFICANT CHANGE UP (ref 3.5–5.3)
R RICKETTSI AB SER-ACNC: SIGNIFICANT CHANGE UP
R RICKETTSI IGM SER-ACNC: SIGNIFICANT CHANGE UP
RICK SF IGG TITR SER IF: SIGNIFICANT CHANGE UP
RICK SF IGM TITR SER IF: SIGNIFICANT CHANGE UP
ROCKY MT SPOTTED FEVER COMMENT: SIGNIFICANT CHANGE UP
SODIUM SERPL-SCNC: 137 MMOL/L — SIGNIFICANT CHANGE UP (ref 135–145)
SPECIMEN SOURCE: SIGNIFICANT CHANGE UP

## 2025-06-28 PROCEDURE — 99239 HOSP IP/OBS DSCHRG MGMT >30: CPT

## 2025-06-28 RX ORDER — CLOPIDOGREL BISULFATE 75 MG/1
1 TABLET, FILM COATED ORAL
Qty: 30 | Refills: 0
Start: 2025-06-28 | End: 2025-07-27

## 2025-06-28 RX ORDER — ATORVASTATIN CALCIUM 80 MG/1
1 TABLET, FILM COATED ORAL
Qty: 30 | Refills: 0
Start: 2025-06-28 | End: 2025-07-27

## 2025-06-28 RX ORDER — AMOXICILLIN AND CLAVULANATE POTASSIUM 500; 125 MG/1; MG/1
1 TABLET, FILM COATED ORAL
Qty: 80 | Refills: 0
Start: 2025-06-28 | End: 2025-08-06

## 2025-06-28 RX ORDER — LISINOPRIL 5 MG/1
1 TABLET ORAL
Qty: 30 | Refills: 0
Start: 2025-06-28 | End: 2025-07-27

## 2025-06-28 RX ADMIN — Medication 25 GRAM(S): at 02:21

## 2025-06-28 RX ADMIN — INSULIN LISPRO 4: 100 INJECTION, SOLUTION INTRAVENOUS; SUBCUTANEOUS at 07:54

## 2025-06-28 RX ADMIN — INSULIN LISPRO 4: 100 INJECTION, SOLUTION INTRAVENOUS; SUBCUTANEOUS at 16:50

## 2025-06-28 RX ADMIN — LISINOPRIL 2.5 MILLIGRAM(S): 5 TABLET ORAL at 10:10

## 2025-06-28 RX ADMIN — INSULIN LISPRO 2: 100 INJECTION, SOLUTION INTRAVENOUS; SUBCUTANEOUS at 11:51

## 2025-06-28 RX ADMIN — HEPARIN SODIUM 5000 UNIT(S): 1000 INJECTION INTRAVENOUS; SUBCUTANEOUS at 05:12

## 2025-06-28 RX ADMIN — Medication 25 GRAM(S): at 10:10

## 2025-06-28 RX ADMIN — Medication 100 MILLILITER(S): at 13:09

## 2025-06-28 RX ADMIN — HEPARIN SODIUM 5000 UNIT(S): 1000 INJECTION INTRAVENOUS; SUBCUTANEOUS at 13:09

## 2025-06-28 RX ADMIN — Medication 100 MILLILITER(S): at 02:44

## 2025-06-28 RX ADMIN — CLOPIDOGREL BISULFATE 75 MILLIGRAM(S): 75 TABLET, FILM COATED ORAL at 10:10

## 2025-06-30 LAB — GRAM STN FLD: ABNORMAL

## 2025-07-01 LAB
-  CLINDAMYCIN: SIGNIFICANT CHANGE UP
-  ERYTHROMYCIN: SIGNIFICANT CHANGE UP
-  GENTAMICIN: SIGNIFICANT CHANGE UP
-  OXACILLIN: SIGNIFICANT CHANGE UP
-  PENICILLIN: SIGNIFICANT CHANGE UP
-  RIFAMPIN: SIGNIFICANT CHANGE UP
-  TETRACYCLINE: SIGNIFICANT CHANGE UP
-  TRIMETHOPRIM/SULFAMETHOXAZOLE: SIGNIFICANT CHANGE UP
-  VANCOMYCIN: SIGNIFICANT CHANGE UP
CULTURE RESULTS: ABNORMAL
CULTURE RESULTS: ABNORMAL
CULTURE RESULTS: SIGNIFICANT CHANGE UP
CULTURE RESULTS: SIGNIFICANT CHANGE UP
METHOD TYPE: SIGNIFICANT CHANGE UP
ORGANISM # SPEC MICROSCOPIC CNT: ABNORMAL
ORGANISM # SPEC MICROSCOPIC CNT: SIGNIFICANT CHANGE UP
ORGANISM # SPEC MICROSCOPIC CNT: SIGNIFICANT CHANGE UP
SPECIMEN SOURCE: SIGNIFICANT CHANGE UP

## 2025-07-03 DIAGNOSIS — M86.671 OTHER CHRONIC OSTEOMYELITIS, RIGHT ANKLE AND FOOT: ICD-10-CM
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
## 2025-07-03 DIAGNOSIS — L97.519 NON-PRESSURE CHRONIC ULCER OF OTHER PART OF RIGHT FOOT WITH UNSPECIFIED SEVERITY: ICD-10-CM

## 2025-07-03 DIAGNOSIS — L03.031 CELLULITIS OF RIGHT TOE: ICD-10-CM

## 2025-07-03 DIAGNOSIS — E11.621 TYPE 2 DIABETES MELLITUS WITH FOOT ULCER: ICD-10-CM

## 2025-07-03 DIAGNOSIS — E11.69 TYPE 2 DIABETES MELLITUS WITH OTHER SPECIFIED COMPLICATION: ICD-10-CM

## 2025-07-03 DIAGNOSIS — E11.65 TYPE 2 DIABETES MELLITUS WITH HYPERGLYCEMIA: ICD-10-CM

## 2025-07-03 DIAGNOSIS — B95.61 METHICILLIN SUSCEPTIBLE STAPHYLOCOCCUS AUREUS INFECTION AS THE CAUSE OF DISEASES CLASSIFIED ELSEWHERE: ICD-10-CM

## 2025-07-03 DIAGNOSIS — Z79.84 LONG TERM (CURRENT) USE OF ORAL HYPOGLYCEMIC DRUGS: ICD-10-CM

## 2025-07-03 DIAGNOSIS — E44.0 MODERATE PROTEIN-CALORIE MALNUTRITION: ICD-10-CM

## 2025-07-03 DIAGNOSIS — A41.01 SEPSIS DUE TO METHICILLIN SUSCEPTIBLE STAPHYLOCOCCUS AUREUS: ICD-10-CM
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
## 2025-07-03 DIAGNOSIS — E11.51 TYPE 2 DIABETES MELLITUS WITH DIABETIC PERIPHERAL ANGIOPATHY WITHOUT GANGRENE: ICD-10-CM

## 2025-07-03 DIAGNOSIS — Z89.022 ACQUIRED ABSENCE OF LEFT FINGER(S): ICD-10-CM

## 2025-07-03 LAB — SURGICAL PATHOLOGY STUDY: SIGNIFICANT CHANGE UP
